# Patient Record
Sex: FEMALE | Race: WHITE | NOT HISPANIC OR LATINO | Employment: FULL TIME | URBAN - METROPOLITAN AREA
[De-identification: names, ages, dates, MRNs, and addresses within clinical notes are randomized per-mention and may not be internally consistent; named-entity substitution may affect disease eponyms.]

---

## 2023-05-17 ENCOUNTER — TELEPHONE (OUTPATIENT)
Dept: PRIMARY CARE | Facility: CLINIC | Age: 68
End: 2023-05-17
Payer: MEDICARE

## 2023-05-18 ENCOUNTER — TELEMEDICINE (OUTPATIENT)
Dept: PRIMARY CARE | Facility: CLINIC | Age: 68
End: 2023-05-18
Payer: MEDICARE

## 2023-05-18 VITALS — TEMPERATURE: 98.1 F

## 2023-05-18 DIAGNOSIS — J98.01 COUGH DUE TO BRONCHOSPASM: ICD-10-CM

## 2023-05-18 DIAGNOSIS — J06.9 ACUTE UPPER RESPIRATORY INFECTION: Primary | ICD-10-CM

## 2023-05-18 PROCEDURE — 99213 OFFICE O/P EST LOW 20 MIN: CPT | Performed by: INTERNAL MEDICINE

## 2023-05-18 RX ORDER — METOPROLOL SUCCINATE 50 MG/1
1 TABLET, EXTENDED RELEASE ORAL DAILY
COMMUNITY
Start: 2022-03-10 | End: 2024-03-19 | Stop reason: SDUPTHER

## 2023-05-18 RX ORDER — VENLAFAXINE HYDROCHLORIDE 37.5 MG/1
37.5 CAPSULE, EXTENDED RELEASE ORAL DAILY
COMMUNITY
End: 2023-05-31 | Stop reason: SDUPTHER

## 2023-05-18 RX ORDER — ROSUVASTATIN CALCIUM 10 MG/1
10 TABLET, COATED ORAL NIGHTLY
COMMUNITY
End: 2024-04-12 | Stop reason: SDUPTHER

## 2023-05-18 RX ORDER — FENOFIBRATE 48 MG/1
48 TABLET, FILM COATED ORAL DAILY
COMMUNITY
End: 2023-06-05 | Stop reason: DRUGHIGH

## 2023-05-18 RX ORDER — ALBUTEROL SULFATE 90 UG/1
2 AEROSOL, METERED RESPIRATORY (INHALATION) EVERY 6 HOURS PRN
Qty: 18 G | Refills: 0 | Status: SHIPPED | OUTPATIENT
Start: 2023-05-18 | End: 2023-10-03

## 2023-05-18 RX ORDER — LEVOTHYROXINE SODIUM 50 UG/1
1 TABLET ORAL DAILY
COMMUNITY
Start: 2019-07-09 | End: 2023-05-23

## 2023-05-18 NOTE — PROGRESS NOTES
Subjective   Patient ID: Laure Barton is a 67 y.o. female who presents for Bronchitis (Video visit for sinus or bronchitis issues).  Reports 7 day history of gradually improving, sinus congestion, intermittent, non productive cough, fatigue, body aches. Resolved low grade fever, that was present at the onset of the symptoms. Denies SOB, chills.      Assessment/Plan   Diagnoses and all orders for this visit:  Acute upper respiratory infection  Cough due to bronchospasm  -     albuterol (ProAir HFA) 90 mcg/actuation inhaler; Inhale 2 puffs every 6 hours if needed for wheezing or shortness of breath.

## 2023-05-22 DIAGNOSIS — E03.9 HYPOTHYROIDISM, UNSPECIFIED TYPE: Primary | ICD-10-CM

## 2023-05-23 RX ORDER — LEVOTHYROXINE SODIUM 50 UG/1
50 TABLET ORAL DAILY
Qty: 90 TABLET | Refills: 0 | Status: SHIPPED | OUTPATIENT
Start: 2023-05-23 | End: 2023-05-31 | Stop reason: SDUPTHER

## 2023-05-31 ENCOUNTER — OFFICE VISIT (OUTPATIENT)
Dept: PRIMARY CARE | Facility: CLINIC | Age: 68
End: 2023-05-31
Payer: MEDICARE

## 2023-05-31 ENCOUNTER — LAB (OUTPATIENT)
Dept: LAB | Facility: LAB | Age: 68
End: 2023-05-31
Payer: MEDICARE

## 2023-05-31 VITALS
DIASTOLIC BLOOD PRESSURE: 74 MMHG | HEIGHT: 63 IN | SYSTOLIC BLOOD PRESSURE: 124 MMHG | BODY MASS INDEX: 30.65 KG/M2 | WEIGHT: 173 LBS

## 2023-05-31 DIAGNOSIS — R89.9 ABNORMAL LABORATORY TEST RESULT: ICD-10-CM

## 2023-05-31 DIAGNOSIS — E03.9 HYPOTHYROIDISM, UNSPECIFIED TYPE: ICD-10-CM

## 2023-05-31 DIAGNOSIS — Z00.00 MEDICARE ANNUAL WELLNESS VISIT, SUBSEQUENT: ICD-10-CM

## 2023-05-31 DIAGNOSIS — R73.01 IFG (IMPAIRED FASTING GLUCOSE): ICD-10-CM

## 2023-05-31 DIAGNOSIS — R80.9 ALBUMINURIA: ICD-10-CM

## 2023-05-31 DIAGNOSIS — E78.1 HYPERTRIGLYCERIDEMIA: ICD-10-CM

## 2023-05-31 DIAGNOSIS — I42.2 CARDIOMYOPATHY, HYPERTROPHIC (MULTI): ICD-10-CM

## 2023-05-31 DIAGNOSIS — E78.5 HYPERLIPIDEMIA, UNSPECIFIED HYPERLIPIDEMIA TYPE: ICD-10-CM

## 2023-05-31 DIAGNOSIS — C50.919 MALIGNANT NEOPLASM OF FEMALE BREAST, UNSPECIFIED ESTROGEN RECEPTOR STATUS, UNSPECIFIED LATERALITY, UNSPECIFIED SITE OF BREAST (MULTI): ICD-10-CM

## 2023-05-31 DIAGNOSIS — D68.51 HETEROZYGOUS FACTOR V LEIDEN MUTATION (MULTI): ICD-10-CM

## 2023-05-31 DIAGNOSIS — Z23 IMMUNIZATION DUE: ICD-10-CM

## 2023-05-31 DIAGNOSIS — F41.8 DEPRESSION WITH ANXIETY: ICD-10-CM

## 2023-05-31 DIAGNOSIS — Z00.00 MEDICARE ANNUAL WELLNESS VISIT, SUBSEQUENT: Primary | ICD-10-CM

## 2023-05-31 LAB
ALANINE AMINOTRANSFERASE (SGPT) (U/L) IN SER/PLAS: 11 U/L (ref 7–45)
ALBUMIN (G/DL) IN SER/PLAS: 4.4 G/DL (ref 3.4–5)
ALBUMIN (MG/L) IN URINE: <7 MG/L
ALBUMIN/CREATININE (UG/MG) IN URINE: NORMAL UG/MG CRT (ref 0–30)
ALKALINE PHOSPHATASE (U/L) IN SER/PLAS: 65 U/L (ref 33–136)
ANION GAP IN SER/PLAS: 13 MMOL/L (ref 10–20)
ASPARTATE AMINOTRANSFERASE (SGOT) (U/L) IN SER/PLAS: 17 U/L (ref 9–39)
BILIRUBIN TOTAL (MG/DL) IN SER/PLAS: 0.5 MG/DL (ref 0–1.2)
CALCIUM (MG/DL) IN SER/PLAS: 9.9 MG/DL (ref 8.6–10.6)
CARBON DIOXIDE, TOTAL (MMOL/L) IN SER/PLAS: 30 MMOL/L (ref 21–32)
CHLORIDE (MMOL/L) IN SER/PLAS: 100 MMOL/L (ref 98–107)
CHOLESTEROL (MG/DL) IN SER/PLAS: 190 MG/DL (ref 0–199)
CHOLESTEROL IN HDL (MG/DL) IN SER/PLAS: 44.3 MG/DL
CHOLESTEROL/HDL RATIO: 4.3
CREATININE (MG/DL) IN SER/PLAS: 0.71 MG/DL (ref 0.5–1.05)
CREATININE (MG/DL) IN URINE: 100 MG/DL (ref 20–320)
ERYTHROCYTE DISTRIBUTION WIDTH (RATIO) BY AUTOMATED COUNT: 12.9 % (ref 11.5–14.5)
ERYTHROCYTE MEAN CORPUSCULAR HEMOGLOBIN CONCENTRATION (G/DL) BY AUTOMATED: 32.5 G/DL (ref 32–36)
ERYTHROCYTE MEAN CORPUSCULAR VOLUME (FL) BY AUTOMATED COUNT: 92 FL (ref 80–100)
ERYTHROCYTES (10*6/UL) IN BLOOD BY AUTOMATED COUNT: 4.06 X10E12/L (ref 4–5.2)
ESTIMATED AVERAGE GLUCOSE FOR HBA1C: 117 MG/DL
GFR FEMALE: >90 ML/MIN/1.73M2
GLUCOSE (MG/DL) IN SER/PLAS: 92 MG/DL (ref 74–99)
HEMATOCRIT (%) IN BLOOD BY AUTOMATED COUNT: 37.5 % (ref 36–46)
HEMOGLOBIN (G/DL) IN BLOOD: 12.2 G/DL (ref 12–16)
HEMOGLOBIN A1C/HEMOGLOBIN TOTAL IN BLOOD: 5.7 %
LDL: ABNORMAL MG/DL (ref 0–99)
LEUKOCYTES (10*3/UL) IN BLOOD BY AUTOMATED COUNT: 6.3 X10E9/L (ref 4.4–11.3)
NON HDL CHOLESTEROL: 146 MG/DL
NRBC (PER 100 WBCS) BY AUTOMATED COUNT: 0 /100 WBC (ref 0–0)
PLATELETS (10*3/UL) IN BLOOD AUTOMATED COUNT: 235 X10E9/L (ref 150–450)
POTASSIUM (MMOL/L) IN SER/PLAS: 5.6 MMOL/L (ref 3.5–5.3)
PROTEIN TOTAL: 7.1 G/DL (ref 6.4–8.2)
SODIUM (MMOL/L) IN SER/PLAS: 137 MMOL/L (ref 136–145)
THYROTROPIN (MIU/L) IN SER/PLAS BY DETECTION LIMIT <= 0.05 MIU/L: 2.11 MIU/L (ref 0.44–3.98)
TRIGLYCERIDE (MG/DL) IN SER/PLAS: 449 MG/DL (ref 0–149)
UREA NITROGEN (MG/DL) IN SER/PLAS: 20 MG/DL (ref 6–23)
VLDL: ABNORMAL MG/DL (ref 0–40)

## 2023-05-31 PROCEDURE — 1160F RVW MEDS BY RX/DR IN RCRD: CPT | Performed by: INTERNAL MEDICINE

## 2023-05-31 PROCEDURE — 82043 UR ALBUMIN QUANTITATIVE: CPT

## 2023-05-31 PROCEDURE — 82570 ASSAY OF URINE CREATININE: CPT

## 2023-05-31 PROCEDURE — 80053 COMPREHEN METABOLIC PANEL: CPT

## 2023-05-31 PROCEDURE — 1159F MED LIST DOCD IN RCRD: CPT | Performed by: INTERNAL MEDICINE

## 2023-05-31 PROCEDURE — G0402 INITIAL PREVENTIVE EXAM: HCPCS | Performed by: INTERNAL MEDICINE

## 2023-05-31 PROCEDURE — 36415 COLL VENOUS BLD VENIPUNCTURE: CPT

## 2023-05-31 PROCEDURE — G0009 ADMIN PNEUMOCOCCAL VACCINE: HCPCS | Performed by: INTERNAL MEDICINE

## 2023-05-31 PROCEDURE — 80061 LIPID PANEL: CPT

## 2023-05-31 PROCEDURE — 84443 ASSAY THYROID STIM HORMONE: CPT

## 2023-05-31 PROCEDURE — 1170F FXNL STATUS ASSESSED: CPT | Performed by: INTERNAL MEDICINE

## 2023-05-31 PROCEDURE — 83036 HEMOGLOBIN GLYCOSYLATED A1C: CPT

## 2023-05-31 PROCEDURE — 90677 PCV20 VACCINE IM: CPT | Performed by: INTERNAL MEDICINE

## 2023-05-31 PROCEDURE — 1036F TOBACCO NON-USER: CPT | Performed by: INTERNAL MEDICINE

## 2023-05-31 PROCEDURE — 85027 COMPLETE CBC AUTOMATED: CPT

## 2023-05-31 RX ORDER — VENLAFAXINE 25 MG/1
1 TABLET ORAL DAILY
COMMUNITY
Start: 2018-11-20 | End: 2023-05-31

## 2023-05-31 RX ORDER — LEVOTHYROXINE SODIUM 50 UG/1
50 TABLET ORAL DAILY
Qty: 90 TABLET | Refills: 3 | Status: SHIPPED | OUTPATIENT
Start: 2023-05-31 | End: 2023-06-03 | Stop reason: SDUPTHER

## 2023-05-31 RX ORDER — VENLAFAXINE HYDROCHLORIDE 37.5 MG/1
37.5 CAPSULE, EXTENDED RELEASE ORAL DAILY
Qty: 90 CAPSULE | Refills: 3 | Status: SHIPPED | OUTPATIENT
Start: 2023-05-31 | End: 2024-04-12 | Stop reason: SDUPTHER

## 2023-05-31 RX ORDER — ANASTROZOLE 1 MG/1
1 TABLET ORAL NIGHTLY
COMMUNITY
End: 2024-04-17

## 2023-05-31 ASSESSMENT — ACTIVITIES OF DAILY LIVING (ADL)
DRESSING: INDEPENDENT
MANAGING_FINANCES: INDEPENDENT
DOING_HOUSEWORK: INDEPENDENT
TAKING_MEDICATION: INDEPENDENT
BATHING: INDEPENDENT
GROCERY_SHOPPING: INDEPENDENT

## 2023-05-31 ASSESSMENT — PATIENT HEALTH QUESTIONNAIRE - PHQ9
2. FEELING DOWN, DEPRESSED OR HOPELESS: NOT AT ALL
SUM OF ALL RESPONSES TO PHQ9 QUESTIONS 1 AND 2: 0
1. LITTLE INTEREST OR PLEASURE IN DOING THINGS: NOT AT ALL

## 2023-05-31 NOTE — PROGRESS NOTES
"Subjective   Patient ID: Laure Barton is a 67 y.o. female who presents for Medicare Annual Wellness Visit Subsequent (Patient here for medicare wellness visit).    HPI   Gradually recovering from an URI, C/O intermittent, non productive cough, no fever, chills, SOB,   Review of Systems  cough  Objective   Ht 1.6 m (5' 3\")   Wt 78.5 kg (173 lb)   BMI 30.65 kg/m²     Physical Exam  NAD. Cooperative.  HEENT: WNL  Neck: WNL  Lungs CTA  Heart: RRR  Abdomen: WNL  Musculoskeletal system: WNL  Neurologic exam: WNL    Assessment/Plan   Diagnoses and all orders for this visit:  Medicare annual wellness visit, subsequent  -     Comprehensive metabolic panel; Future  Hypothyroidism, unspecified type  -     levothyroxine (Synthroid, Levoxyl) 50 mcg tablet; Take 1 tablet (50 mcg) by mouth once daily.  -     CBC; Future  -     TSH; Future  Immunization due  -     Pneumococcal conjugate vaccine, 20-valent, adult (PREVNAR 20)  Hyperlipidemia, unspecified hyperlipidemia type  -     Lipid panel; Future  Hypertriglyceridemia  Albuminuria  -     Albumin, urine, random; Future  IFG (impaired fasting glucose)  -     Hemoglobin A1C; Future  Depression with anxiety  -     venlafaxine XR (Effexor-XR) 37.5 mg 24 hr capsule; Take 1 capsule (37.5 mg) by mouth once daily.  Postviral drip and bronchospasm cough, improving.  Hydration, PRN follow up.       "

## 2023-06-03 PROBLEM — Z91.89 AT RISK FOR DIABETES MELLITUS: Status: ACTIVE | Noted: 2023-06-03

## 2023-06-03 RX ORDER — FENOFIBRATE 160 MG/1
160 TABLET ORAL DAILY
Qty: 90 TABLET | Refills: 1 | Status: SHIPPED | OUTPATIENT
Start: 2023-06-03 | End: 2023-06-05 | Stop reason: SDUPTHER

## 2023-06-03 RX ORDER — LEVOTHYROXINE SODIUM 50 UG/1
50 TABLET ORAL DAILY
Qty: 90 TABLET | Refills: 3 | Status: SHIPPED | OUTPATIENT
Start: 2023-06-03 | End: 2024-04-12 | Stop reason: SDUPTHER

## 2023-06-05 DIAGNOSIS — E78.1 HYPERTRIGLYCERIDEMIA: ICD-10-CM

## 2023-06-05 RX ORDER — FENOFIBRATE 160 MG/1
160 TABLET ORAL DAILY
Qty: 90 TABLET | Refills: 1 | Status: SHIPPED | OUTPATIENT
Start: 2023-06-05 | End: 2023-12-07

## 2023-06-06 ENCOUNTER — HOSPITAL ENCOUNTER (OUTPATIENT)
Dept: DATA CONVERSION | Facility: HOSPITAL | Age: 68
End: 2023-06-06
Attending: INTERNAL MEDICINE
Payer: MEDICARE

## 2023-06-06 DIAGNOSIS — K64.8 OTHER HEMORRHOIDS: ICD-10-CM

## 2023-06-06 DIAGNOSIS — I10 ESSENTIAL (PRIMARY) HYPERTENSION: ICD-10-CM

## 2023-06-06 DIAGNOSIS — F41.9 ANXIETY DISORDER, UNSPECIFIED: ICD-10-CM

## 2023-06-06 DIAGNOSIS — K57.30 DIVERTICULOSIS OF LARGE INTESTINE WITHOUT PERFORATION OR ABSCESS WITHOUT BLEEDING: ICD-10-CM

## 2023-06-06 DIAGNOSIS — J45.909 UNSPECIFIED ASTHMA, UNCOMPLICATED (HHS-HCC): ICD-10-CM

## 2023-06-06 DIAGNOSIS — D64.9 ANEMIA, UNSPECIFIED: ICD-10-CM

## 2023-06-06 DIAGNOSIS — D12.3 BENIGN NEOPLASM OF TRANSVERSE COLON: ICD-10-CM

## 2023-06-06 DIAGNOSIS — Z12.11 ENCOUNTER FOR SCREENING FOR MALIGNANT NEOPLASM OF COLON: ICD-10-CM

## 2023-06-06 DIAGNOSIS — Z85.3 PERSONAL HISTORY OF MALIGNANT NEOPLASM OF BREAST: ICD-10-CM

## 2023-06-06 DIAGNOSIS — G47.33 OBSTRUCTIVE SLEEP APNEA (ADULT) (PEDIATRIC): ICD-10-CM

## 2023-06-06 DIAGNOSIS — D68.51 ACTIVATED PROTEIN C RESISTANCE (MULTI): ICD-10-CM

## 2023-06-06 DIAGNOSIS — E78.5 HYPERLIPIDEMIA, UNSPECIFIED: ICD-10-CM

## 2023-06-06 DIAGNOSIS — D69.6 THROMBOCYTOPENIA, UNSPECIFIED (CMS-HCC): ICD-10-CM

## 2023-06-06 DIAGNOSIS — F32.A DEPRESSION, UNSPECIFIED: ICD-10-CM

## 2023-06-06 DIAGNOSIS — Z88.0 ALLERGY STATUS TO PENICILLIN: ICD-10-CM

## 2023-06-06 DIAGNOSIS — E03.9 HYPOTHYROIDISM, UNSPECIFIED: ICD-10-CM

## 2023-06-06 DIAGNOSIS — K21.9 GASTRO-ESOPHAGEAL REFLUX DISEASE WITHOUT ESOPHAGITIS: ICD-10-CM

## 2023-06-13 ENCOUNTER — LAB (OUTPATIENT)
Dept: LAB | Facility: LAB | Age: 68
End: 2023-06-13
Payer: MEDICARE

## 2023-06-13 DIAGNOSIS — R89.9 ABNORMAL LABORATORY TEST RESULT: ICD-10-CM

## 2023-06-13 LAB — POTASSIUM (MMOL/L) IN SER/PLAS: 5.1 MMOL/L (ref 3.5–5.3)

## 2023-06-13 PROCEDURE — 84132 ASSAY OF SERUM POTASSIUM: CPT

## 2023-06-13 PROCEDURE — 36415 COLL VENOUS BLD VENIPUNCTURE: CPT

## 2023-06-14 LAB
COMPLETE PATHOLOGY REPORT: NORMAL
CONVERTED CLINICAL DIAGNOSIS-HISTORY: NORMAL
CONVERTED FINAL DIAGNOSIS: NORMAL
CONVERTED FINAL REPORT PDF LINK TO COPY AND PASTE: NORMAL
CONVERTED GROSS DESCRIPTION: NORMAL

## 2023-06-23 ENCOUNTER — OFFICE VISIT (OUTPATIENT)
Dept: PRIMARY CARE | Facility: CLINIC | Age: 68
End: 2023-06-23
Payer: MEDICARE

## 2023-06-23 VITALS
BODY MASS INDEX: 30.47 KG/M2 | WEIGHT: 172 LBS | SYSTOLIC BLOOD PRESSURE: 124 MMHG | HEART RATE: 78 BPM | OXYGEN SATURATION: 98 % | DIASTOLIC BLOOD PRESSURE: 76 MMHG

## 2023-06-23 DIAGNOSIS — S40.869A TICK BITE OF UPPER ARM, UNSPECIFIED LATERALITY, INITIAL ENCOUNTER: Primary | ICD-10-CM
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
## 2023-06-23 DIAGNOSIS — L30.9 DERMATITIS: ICD-10-CM

## 2023-06-23 PROCEDURE — 1036F TOBACCO NON-USER: CPT | Performed by: INTERNAL MEDICINE

## 2023-06-23 PROCEDURE — 99214 OFFICE O/P EST MOD 30 MIN: CPT | Performed by: INTERNAL MEDICINE

## 2023-06-23 PROCEDURE — 1160F RVW MEDS BY RX/DR IN RCRD: CPT | Performed by: INTERNAL MEDICINE

## 2023-06-23 PROCEDURE — 1159F MED LIST DOCD IN RCRD: CPT | Performed by: INTERNAL MEDICINE

## 2023-06-23 RX ORDER — DOXYCYCLINE 100 MG/1
200 CAPSULE ORAL ONCE
Qty: 2 CAPSULE | Refills: 1 | Status: SHIPPED | OUTPATIENT
Start: 2023-06-23 | End: 2023-06-23

## 2023-06-23 NOTE — PROGRESS NOTES
Subjective   Patient ID: Laure Barton is a 67 y.o. female who presents for Tick Removal (Patient had tick on her over the weekend and scar tissue irritated on her stomach ).    HPI   The patient reports tick bite, removed 72 hours prior the office visit, no skin rash or other symptoms.  C/O skin redness on the lower abdomen,  scar area, 5 days duration, unchanged since the onset, with a minimal  tenderness with applying pressure.    Review of Systems  Tick bite  Skin redness  Objective   Pulse 78   Wt 78 kg (172 lb)   SpO2 98%   BMI 30.47 kg/m²     Physical Exam  NAD. Cooperative.  Skin: erythematous patch, well demarcated, suprapubic area, over the distal area of the well healed surgical scar, 1 cm in size,   Lungs CTA  Heart: RRR  Abdomen: WNL  Musculoskeletal system: WNL  Neurologic exam: WNL    Assessment/Plan   Diagnoses and all orders for this visit:  Tick bite of upper arm, unspecified laterality, initial encounter  -     doxycycline (Vibramycin) 100 mg capsule; Take 2 capsules (200 mg) by mouth 1 time for 1 dose. Take with at least 8 ounces (large glass) of water, do not lie down for 30 minutes after  Dermatitis  Watchful waiting, a trial of topical Ketoconazole 2% cream from home supply if not resolving, followed by topical steroid cream.

## 2023-10-01 PROBLEM — M25.569 JOINT PAIN, KNEE: Status: ACTIVE | Noted: 2023-10-01

## 2023-10-01 PROBLEM — D22.5 MELANOCYTIC NEVI OF TRUNK: Status: ACTIVE | Noted: 2022-09-26

## 2023-10-01 PROBLEM — M21.6X9 PRONATION DEFORMITY OF FOOT: Status: ACTIVE | Noted: 2023-10-01

## 2023-10-01 PROBLEM — Z90.11 STATUS POST PARTIAL MASTECTOMY OF RIGHT BREAST: Status: ACTIVE | Noted: 2023-10-01

## 2023-10-01 PROBLEM — M62.838 MUSCLE SPASM: Status: ACTIVE | Noted: 2023-10-01

## 2023-10-01 PROBLEM — R09.89 RHONCHI AT LEFT LUNG BASE: Status: ACTIVE | Noted: 2023-10-01

## 2023-10-01 PROBLEM — I49.3 PVC (PREMATURE VENTRICULAR CONTRACTION): Status: ACTIVE | Noted: 2023-10-01

## 2023-10-01 PROBLEM — M54.16 LUMBAR RADICULOPATHY: Status: ACTIVE | Noted: 2023-10-01

## 2023-10-01 PROBLEM — I34.0 NON-RHEUMATIC MITRAL REGURGITATION: Status: ACTIVE | Noted: 2023-10-01

## 2023-10-01 PROBLEM — L82.1 OTHER SEBORRHEIC KERATOSIS: Status: ACTIVE | Noted: 2022-09-26

## 2023-10-01 PROBLEM — J30.89 ALLERGIC RHINITIS DUE TO DUST: Status: ACTIVE | Noted: 2023-10-01

## 2023-10-01 PROBLEM — R51.9 HEADACHE: Status: ACTIVE | Noted: 2023-10-01

## 2023-10-01 PROBLEM — I10 HYPERTENSION: Status: ACTIVE | Noted: 2023-10-01

## 2023-10-01 PROBLEM — G47.33 OBSTRUCTIVE SLEEP APNEA SYNDROME: Status: ACTIVE | Noted: 2017-03-23

## 2023-10-01 PROBLEM — S20.219A CONTUSION OF RIB: Status: ACTIVE | Noted: 2023-10-01

## 2023-10-01 PROBLEM — Z20.822 EXPOSURE TO SEVERE ACUTE RESPIRATORY SYNDROME CORONAVIRUS 2 (SARS-COV-2): Status: ACTIVE | Noted: 2023-10-01

## 2023-10-01 PROBLEM — E87.1 HYPONATREMIA: Status: ACTIVE | Noted: 2023-10-01

## 2023-10-01 PROBLEM — R92.1 BREAST CALCIFICATION, RIGHT: Status: ACTIVE | Noted: 2023-10-01

## 2023-10-01 PROBLEM — R30.0 DYSURIA: Status: ACTIVE | Noted: 2023-10-01

## 2023-10-01 PROBLEM — S39.012A LOW BACK STRAIN: Status: ACTIVE | Noted: 2023-10-01

## 2023-10-01 PROBLEM — E55.9 VITAMIN D DEFICIENCY: Status: ACTIVE | Noted: 2023-10-01

## 2023-10-01 PROBLEM — R00.2 PALPITATIONS: Status: ACTIVE | Noted: 2023-10-01

## 2023-10-01 PROBLEM — L23.7 ALLERGIC CONTACT DERMATITIS DUE TO PLANTS, EXCEPT FOOD: Status: ACTIVE | Noted: 2022-09-26

## 2023-10-01 PROBLEM — H10.45 CHRONIC ALLERGIC CONJUNCTIVITIS: Status: ACTIVE | Noted: 2023-10-01

## 2023-10-01 PROBLEM — R32 URINARY INCONTINENCE: Status: ACTIVE | Noted: 2023-10-01
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
## 2023-10-01 PROBLEM — R50.9 FEVER AND CHILLS: Status: ACTIVE | Noted: 2023-10-01

## 2023-10-01 PROBLEM — B37.89 CANDIDIASIS OF BREAST: Status: ACTIVE | Noted: 2023-10-01

## 2023-10-01 PROBLEM — M47.816 OSTEOARTHRITIS OF LUMBAR SPINE: Status: ACTIVE | Noted: 2023-10-01

## 2023-10-01 PROBLEM — J18.9 PNEUMONIA: Status: ACTIVE | Noted: 2023-10-01

## 2023-10-01 PROBLEM — D64.9 ANEMIA: Status: ACTIVE | Noted: 2023-10-01

## 2023-10-01 PROBLEM — L81.4 OTHER MELANIN HYPERPIGMENTATION: Status: ACTIVE | Noted: 2022-09-26

## 2023-10-01 PROBLEM — D05.11 DUCTAL CARCINOMA IN SITU (DCIS) OF RIGHT BREAST: Status: ACTIVE | Noted: 2023-10-01

## 2023-10-01 PROBLEM — J98.01 BRONCHOSPASM: Status: ACTIVE | Noted: 2023-10-01

## 2023-10-01 PROBLEM — D18.01 HEMANGIOMA OF SKIN AND SUBCUTANEOUS TISSUE: Status: ACTIVE | Noted: 2022-09-26

## 2023-10-01 PROBLEM — R94.8: Status: ACTIVE | Noted: 2023-10-01

## 2023-10-01 PROBLEM — J34.89 STUFFY AND RUNNY NOSE: Status: ACTIVE | Noted: 2023-10-01

## 2023-10-01 PROBLEM — N63.0 BREAST NODULE: Status: ACTIVE | Noted: 2023-10-01

## 2023-10-01 PROBLEM — F41.9 ANXIETY: Status: ACTIVE | Noted: 2023-10-01

## 2023-10-01 PROBLEM — L91.0 KELOID: Status: ACTIVE | Noted: 2022-09-26

## 2023-10-01 PROBLEM — M70.50 PES ANSERINE BURSITIS: Status: ACTIVE | Noted: 2023-10-01

## 2023-10-01 PROBLEM — M54.9 BACK PAIN: Status: ACTIVE | Noted: 2023-10-01

## 2023-10-01 PROBLEM — R07.89 ATYPICAL CHEST PAIN: Status: ACTIVE | Noted: 2023-10-01

## 2023-10-01 PROBLEM — M25.561 RIGHT KNEE PAIN: Status: ACTIVE | Noted: 2023-10-01

## 2023-10-01 PROBLEM — M25.512 LEFT SHOULDER PAIN: Status: ACTIVE | Noted: 2023-10-01

## 2023-10-01 PROBLEM — S76.219A STRAIN OF ADDUCTOR MUSCLE OF THIGH: Status: ACTIVE | Noted: 2018-01-09

## 2023-10-01 PROBLEM — R92.8 ABNORMAL MAMMOGRAM OF RIGHT BREAST: Status: ACTIVE | Noted: 2023-10-01

## 2023-10-01 PROBLEM — L91.0 HYPERTROPHIC SCAR: Status: ACTIVE | Noted: 2022-09-26

## 2023-10-01 PROBLEM — D68.59 THROMBOPHILIA (MULTI): Status: ACTIVE | Noted: 2023-10-01

## 2023-10-01 PROBLEM — L91.8 OTHER HYPERTROPHIC DISORDERS OF THE SKIN: Status: ACTIVE | Noted: 2022-09-26

## 2023-10-01 PROBLEM — R29.898 WEAKNESS OF RIGHT LOWER EXTREMITY: Status: ACTIVE | Noted: 2023-10-01

## 2023-10-01 PROBLEM — S23.8XXA SPRAIN OF CHEST WALL: Status: ACTIVE | Noted: 2023-10-01

## 2023-10-01 PROBLEM — M94.0 COSTOCHONDRITIS: Status: ACTIVE | Noted: 2023-10-01

## 2023-10-01 PROBLEM — R07.81 RIB PAIN: Status: ACTIVE | Noted: 2023-10-01

## 2023-10-01 PROBLEM — T78.40XA ALLERGIES: Status: ACTIVE | Noted: 2023-10-01

## 2023-10-01 PROBLEM — R05.3 CHRONIC COUGH: Status: ACTIVE | Noted: 2023-10-01

## 2023-10-01 RX ORDER — OMEGA-3-ACID ETHYL ESTERS 1 G/1
4 CAPSULE, LIQUID FILLED ORAL
COMMUNITY
End: 2023-10-03 | Stop reason: ALTCHOICE

## 2023-10-01 RX ORDER — CHOLECALCIFEROL (VITAMIN D3) 50 MCG
TABLET ORAL
COMMUNITY
Start: 2019-10-10

## 2023-10-01 RX ORDER — TIZANIDINE 2 MG/1
TABLET ORAL
COMMUNITY
Start: 2022-10-28 | End: 2023-10-03 | Stop reason: ALTCHOICE

## 2023-10-01 RX ORDER — MULTIVITAMIN
1 TABLET ORAL DAILY
COMMUNITY
Start: 2022-02-28 | End: 2023-10-03 | Stop reason: ALTCHOICE

## 2023-10-01 RX ORDER — CYCLOBENZAPRINE HCL 10 MG
1 TABLET ORAL NIGHTLY
COMMUNITY
Start: 2022-08-18 | End: 2023-10-03 | Stop reason: ALTCHOICE

## 2023-10-01 RX ORDER — PREDNISONE 10 MG/1
TABLET ORAL DAILY
COMMUNITY
Start: 2022-10-28 | End: 2023-10-03 | Stop reason: ALTCHOICE

## 2023-10-01 RX ORDER — POLYETHYLENE GLYCOL 3350, SODIUM CHLORIDE, SODIUM BICARBONATE, POTASSIUM CHLORIDE 420; 11.2; 5.72; 1.48 G/4L; G/4L; G/4L; G/4L
POWDER, FOR SOLUTION ORAL
COMMUNITY
Start: 2023-04-03 | End: 2023-10-03 | Stop reason: ALTCHOICE

## 2023-10-01 RX ORDER — KETOCONAZOLE 20 MG/G
CREAM TOPICAL
COMMUNITY
Start: 2022-05-05 | End: 2023-10-03 | Stop reason: ALTCHOICE

## 2023-10-01 RX ORDER — OMEPRAZOLE 20 MG/1
20 CAPSULE, DELAYED RELEASE ORAL WEEKLY
COMMUNITY
End: 2023-10-03 | Stop reason: ALTCHOICE

## 2023-10-01 RX ORDER — ASPIRIN 81 MG/1
TABLET ORAL
COMMUNITY
Start: 2010-11-23 | End: 2023-10-03 | Stop reason: ALTCHOICE

## 2023-10-01 RX ORDER — FLUTICASONE PROPIONATE 50 MCG
2 SPRAY, SUSPENSION (ML) NASAL DAILY
COMMUNITY
Start: 2021-12-13 | End: 2023-10-03 | Stop reason: ALTCHOICE

## 2023-10-03 ENCOUNTER — OFFICE VISIT (OUTPATIENT)
Dept: CARDIOLOGY | Facility: CLINIC | Age: 68
End: 2023-10-03
Payer: MEDICARE

## 2023-10-03 VITALS
WEIGHT: 177 LBS | TEMPERATURE: 97.8 F | SYSTOLIC BLOOD PRESSURE: 128 MMHG | HEART RATE: 76 BPM | DIASTOLIC BLOOD PRESSURE: 77 MMHG | BODY MASS INDEX: 31.35 KG/M2

## 2023-10-03 DIAGNOSIS — E03.9 HYPOTHYROIDISM, UNSPECIFIED TYPE: ICD-10-CM

## 2023-10-03 DIAGNOSIS — Z98.890 S/P VENTRICULAR SEPTAL MYECTOMY: ICD-10-CM

## 2023-10-03 DIAGNOSIS — I10 ESSENTIAL HYPERTENSION: ICD-10-CM

## 2023-10-03 DIAGNOSIS — I42.2 CARDIOMYOPATHY, HYPERTROPHIC (MULTI): Primary | ICD-10-CM

## 2023-10-03 DIAGNOSIS — D68.51 HETEROZYGOUS FACTOR V LEIDEN MUTATION (MULTI): ICD-10-CM

## 2023-10-03 PROCEDURE — 1160F RVW MEDS BY RX/DR IN RCRD: CPT | Performed by: INTERNAL MEDICINE

## 2023-10-03 PROCEDURE — 1125F AMNT PAIN NOTED PAIN PRSNT: CPT | Performed by: INTERNAL MEDICINE

## 2023-10-03 PROCEDURE — 99214 OFFICE O/P EST MOD 30 MIN: CPT | Performed by: INTERNAL MEDICINE

## 2023-10-03 PROCEDURE — 3074F SYST BP LT 130 MM HG: CPT | Performed by: INTERNAL MEDICINE

## 2023-10-03 PROCEDURE — 1159F MED LIST DOCD IN RCRD: CPT | Performed by: INTERNAL MEDICINE

## 2023-10-03 PROCEDURE — 3078F DIAST BP <80 MM HG: CPT | Performed by: INTERNAL MEDICINE

## 2023-10-03 PROCEDURE — 1036F TOBACCO NON-USER: CPT | Performed by: INTERNAL MEDICINE

## 2023-10-03 NOTE — PROGRESS NOTES
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 Lives with her  and her adult son. Here for routine visit. She is doing well and is essentially asymptomatic.    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 Valladares, February 2022     Assessment: HCM and prior myectomy, doing great, routine visit    -- Continue metoprolol succinate  -- I encouraged exercise  -- Will having our HCM coordinator call her and offer her son genetic testing    Jorge Fernandez MD, MPH  Advanced Heart Failure and Transplant Cardiology  [facility]  [facility]

## 2023-10-03 NOTE — PATIENT INSTRUCTIONS
To reach Dr. Fernandez's office please call [phone] ([facility]). Fax [phone]. Call [phone] to schedule an appointment. You may also contact the HF RNs at [email or web address]    Thank you for coming to your appointment today. If you have any questions or need cardiac medication refills, please call the Heart Failure Office at [phone] option [phone].   You may also contact the HF RNs at [email or web address]     Follow up with Dr. Fernandez in 2-3 years

## 2023-10-25 ENCOUNTER — TELEPHONE (OUTPATIENT)
Dept: DERMATOLOGY | Facility: CLINIC | Age: 68
End: 2023-10-25
Payer: MEDICARE

## 2023-10-27 ENCOUNTER — OFFICE VISIT (OUTPATIENT)
Dept: DERMATOLOGY | Facility: CLINIC | Age: 68
End: 2023-10-27
Payer: MEDICARE

## 2023-10-27 DIAGNOSIS — L57.0 ACTINIC KERATOSIS: Primary | ICD-10-CM

## 2023-10-27 PROCEDURE — 1036F TOBACCO NON-USER: CPT | Performed by: NURSE PRACTITIONER

## 2023-10-27 PROCEDURE — 1159F MED LIST DOCD IN RCRD: CPT | Performed by: NURSE PRACTITIONER

## 2023-10-27 PROCEDURE — 1160F RVW MEDS BY RX/DR IN RCRD: CPT | Performed by: NURSE PRACTITIONER

## 2023-10-27 PROCEDURE — 99213 OFFICE O/P EST LOW 20 MIN: CPT | Performed by: NURSE PRACTITIONER

## 2023-10-27 PROCEDURE — 1125F AMNT PAIN NOTED PAIN PRSNT: CPT | Performed by: NURSE PRACTITIONER

## 2023-10-27 ASSESSMENT — DERMATOLOGY QUALITY OF LIFE (QOL) ASSESSMENT
DATE THE QUALITY-OF-LIFE ASSESSMENT WAS COMPLETED: 66774
RATE HOW EMOTIONALLY BOTHERED YOU ARE BY YOUR SKIN PROBLEM (FOR EXAMPLE, WORRY, EMBARRASSMENT, FRUSTRATION): 0 - NEVER BOTHERED
RATE HOW BOTHERED YOU ARE BY SYMPTOMS OF YOUR SKIN PROBLEM (EG, ITCHING, STINGING BURNING, HURTING OR SKIN IRRITATION): 0 - NEVER BOTHERED
ARE THERE EXCLUSIONS OR EXCEPTIONS FOR THE QUALITY OF LIFE ASSESSMENT: NO
RATE HOW BOTHERED YOU ARE BY EFFECTS OF YOUR SKIN PROBLEMS ON YOUR ACTIVITIES (EG, GOING OUT, ACCOMPLISHING WHAT YOU WANT, WORK ACTIVITIES OR YOUR RELATIONSHIPS WITH OTHERS): 0 - NEVER BOTHERED

## 2023-10-27 ASSESSMENT — ITCH NUMERIC RATING SCALE: HOW SEVERE IS YOUR ITCHING?: 0

## 2023-10-27 ASSESSMENT — DERMATOLOGY PATIENT ASSESSMENT: DO YOU HAVE ANY NEW OR CHANGING LESIONS: YES

## 2023-10-27 NOTE — PROGRESS NOTES
Subjective     Laure Barton is a 68 y.o. female who presents for the following: Lesion.   Patient in today for single lesion on bottom lip x2 months.  Patient describes as scabbing falling off and returning again.  Patient states today there is no scab present.  Patient states she has tried Chapstick to alleviate area.  Patient previously seen September 26, 2022 by Dr. Castanon for full body skin exam .       Review of Systems:  No other skin or systemic complaints other than what is documented elsewhere in the note.    The following portions of the chart were reviewed this encounter and updated as appropriate:       Skin Cancer History  No skin cancer on file.    Specialty Problems          Dermatology Problems    Hemangioma of skin and subcutaneous tissue    Hypertrophic scar    Keloid    Melanocytic nevi of trunk    Other hypertrophic disorders of the skin    Other melanin hyperpigmentation    Other seborrheic keratosis    Contusion of rib     Past Medical History:  Laure Barton  has a past medical history of Palpitations, Personal history of other diseases of the digestive system, Personal history of other endocrine, nutritional and metabolic disease, and Personal history of other specified conditions (12/26/2020).    Past Surgical History:  Laure Barton  has a past surgical history that includes Other surgical history (01/03/2022) and Other surgical history (03/25/2022).    Family History:  Patient family history includes Breast cancer in her mother and sister; Coronary artery disease in an other family member; Dementia in her mother and sister; Diabetes in her brother and father; Glaucoma in her mother; Hypertension in her mother; Osteoarthritis in her father; Pancreatic cancer in her mother; Sleep apnea in her sister; Sudden death in her father and sister; Uterine cancer in her paternal grandmother.    Social History:  Laure Barton  reports that she has never smoked. She  has never used smokeless tobacco. Alcohol use questions deferred to the physician. She reports that she does not use drugs.    Allergies:  Sulfa (sulfonamide antibiotics)    Current Medications / CAM's:    Current Outpatient Medications:     albuterol (ProAir HFA) 90 mcg/actuation inhaler, Inhale 2 puffs every 6 hours if needed for wheezing or shortness of breath., Disp: 18 g, Rfl: 0    anastrozole (Arimidex) 1 mg tablet, Take 1 tablet (1 mg total) by mouth once daily at bedtime., Disp: , Rfl:     cholecalciferol (Vitamin D-3) 50 MCG (2000 UT) tablet, Take by mouth., Disp: , Rfl:     fenofibrate (Triglide) 160 mg tablet, Take 1 tablet (160 mg) by mouth once daily., Disp: 90 tablet, Rfl: 1    levothyroxine (Synthroid, Levoxyl) 50 mcg tablet, Take 1 tablet (50 mcg) by mouth once daily., Disp: 90 tablet, Rfl: 3    metoprolol succinate XL (Toprol-XL) 50 mg 24 hr tablet, Take 1 tablet (50 mg) by mouth once daily., Disp: , Rfl:     rosuvastatin (Crestor) 10 mg tablet, Take 1 tablet (10 mg) by mouth once daily at bedtime., Disp: , Rfl:     venlafaxine XR (Effexor-XR) 37.5 mg 24 hr capsule, Take 1 capsule (37.5 mg) by mouth once daily., Disp: 90 capsule, Rfl: 3     Objective   Well appearing patient in no apparent distress; mood and affect are within normal limits.      Assessment/Plan

## 2023-12-07 DIAGNOSIS — E78.1 HYPERTRIGLYCERIDEMIA: ICD-10-CM

## 2023-12-07 RX ORDER — FENOFIBRATE 160 MG/1
160 TABLET ORAL DAILY
Qty: 30 TABLET | Refills: 0 | Status: SHIPPED | OUTPATIENT
Start: 2023-12-07 | End: 2024-01-15

## 2023-12-14 ENCOUNTER — HOSPITAL ENCOUNTER (OUTPATIENT)
Dept: RADIOLOGY | Facility: HOSPITAL | Age: 68
Discharge: HOME | End: 2023-12-14
Payer: MEDICARE

## 2023-12-14 DIAGNOSIS — D05.11 INTRADUCTAL CARCINOMA IN SITU OF RIGHT BREAST: ICD-10-CM

## 2023-12-14 DIAGNOSIS — Z85.3 PERSONAL HISTORY OF MALIGNANT NEOPLASM OF BREAST: ICD-10-CM

## 2023-12-14 DIAGNOSIS — Z08 ENCOUNTER FOR FOLLOW-UP EXAMINATION AFTER COMPLETED TREATMENT FOR MALIGNANT NEOPLASM: ICD-10-CM

## 2023-12-14 PROCEDURE — 77066 DX MAMMO INCL CAD BI: CPT | Performed by: STUDENT IN AN ORGANIZED HEALTH CARE EDUCATION/TRAINING PROGRAM

## 2023-12-14 PROCEDURE — G0279 TOMOSYNTHESIS, MAMMO: HCPCS | Performed by: STUDENT IN AN ORGANIZED HEALTH CARE EDUCATION/TRAINING PROGRAM

## 2023-12-14 PROCEDURE — 77066 DX MAMMO INCL CAD BI: CPT

## 2023-12-28 NOTE — PROGRESS NOTES
BREAST SURGERY NEW PATIENT VISIT    Subjective   Laure Barton is a 68 y.o. female referred by Dr. Pacheco  for follow-up of right breast DCIS.  She is doing well and has no complaints or concerns.  She is taking anastrozole and tolerating it without problems.    DCIS of the right breast diagnosed in 2022.  ER/IN positive  Right breast lumpectomy on 4/15/2022  Radiation therapy completed in 2022  Anastrozole started 2022      MEDICAL HISTORY; hypertension; high cholesterol; cardiomyopathy; factor V Leiden; depression with anxiety; hypothyroid,Hypotrophic obstructive cardiomyopathy s/p open heart surgery 2022, spine issues     SURGICAL HISTORY; C-sections, heart surgery; right lumpectomy 2022     REVIEW OF SYSTEMS;feeling tired, dry eyes, itchy eyes,loss of hearing, shortness of breath on exertion, joint pain, muscle weakness, joint stiffness, back pain,depression, rest is negative for 14 point review      FEMALE HISTORY: First menstrual cycle age 14; last menstrual cycle age 51;  1 para 2; age of first birth 34; breast-fed both children for more than 12 months.  Never took birth control     FAMILY HISTORY: Sister with breast cancer diagnosed in her 50s  at 69 with dementia  Paternal grandmother with ovarian cancer diagnosed in her 40s ( long ago, the patient never knew her grandmother)   Mother with possible pancreatic cancer in her 80's    Mammogram:   Bilateral mammogram done on 2023 which shows posttreatment changes in the right breast and no evidence of malignancy in either breast.  Category 2.  Radiology review: All images and reports were personally reviewed.     Non-smoker    GYN History:  Menarche: 15  Menopause: 52  HRT: No  Birth control use: No  Fertility treatment: No  Hysterectomy: No    G 1 P 2  Age at First Live Birth: 33  Breast-feeding: yes      Family history: Sister with breast cancer in her early 60s, ; paternal grandmother  with uterine cancer in her 40s,       Review of Systems   Constitutional symptoms: Denies generalized fatigue.  Denies weight change, fevers/chills, difficulty sleeping   Eyes: Denies double vision, glaucoma, cataracts.  Ear/nose/throat/mouth: Denies hearing changes, sore throat, sinus problems.  Cardiovascular: No chest pain.  Denies irregular heartbeat.  Denies ankle swelling.  Respiratory: No wheezing, cough, or shortness of breath.  Gastrointestinal: No abdominal pain,  No nausea/vomiting.  No indigestion/heartburn.  No change in bowel habits.  No constipation or diarrhea.   Genitourinary: No urinary incontinence.  No urinary frequency.  No painful urination.  Musculoskeletal: No bone pain, no muscle pain, no joint pain.   Integumentary: No rash. No masses.  No changes in moles.  No easy bruising.  Neurological: No headaches.  No tremors. No numbness/tingling.  Psychiatric: No anxiety. No depression.  Endocrine: No excessive thirst.  Not too hot or too cold.  Not tired or fatigued.    Hematological/lymphatic: No swollen glands or blood clotting problems.  No bruising.    PHYSICAL EXAM:    General: Alert and oriented x 3.  Mood and affect are appropriate.  HEENT: EOMI, PERRLA.   Neck: supple, no masses, no cervical adenopathy.  Cardiovascular: no lower extremity edema.  Regular rhythm.  Pulmonary: breathing non labored on room air.  Clear to auscultation.  GI: Abdomen soft, no masses. No hepatomegaly or splenomegaly.  Lymph nodes: No supraclavicular or axillary adenopathy bilaterally.  Musculoskeletal: Full range of motion in the upper extremities bilaterally.  Neuro: denies dizziness, tremors  Physical Exam  Chest:          Comments: Lymph node exam shows no cervical, supraclavicular, or axillary lymphadenopathy.  Breast exam shows symmetric breasts bilaterally with no skin changes, no dominant masses and no nipple discharge in either breast.        Assessment/Plan     DCIS of the right breast diagnosed  in March 2022.  ER/AK positive  Doing well.  Stable exam and bilateral mammogram.  Continue anastrozole.  Follow-up with me in December 2024 with a bilateral mammogram.      Andressa Bee MD

## 2024-01-09 ENCOUNTER — OFFICE VISIT (OUTPATIENT)
Dept: SURGICAL ONCOLOGY | Facility: CLINIC | Age: 69
End: 2024-01-09
Payer: MEDICARE

## 2024-01-09 VITALS
WEIGHT: 169.9 LBS | TEMPERATURE: 97.7 F | HEIGHT: 63 IN | SYSTOLIC BLOOD PRESSURE: 148 MMHG | HEART RATE: 74 BPM | BODY MASS INDEX: 30.11 KG/M2 | DIASTOLIC BLOOD PRESSURE: 77 MMHG

## 2024-01-09 DIAGNOSIS — D05.11 DUCTAL CARCINOMA IN SITU (DCIS) OF RIGHT BREAST: Primary | ICD-10-CM

## 2024-01-09 DIAGNOSIS — Z12.31 SCREENING MAMMOGRAM FOR HIGH-RISK PATIENT: ICD-10-CM

## 2024-01-09 PROCEDURE — 3077F SYST BP >= 140 MM HG: CPT | Performed by: SURGERY

## 2024-01-09 PROCEDURE — 99204 OFFICE O/P NEW MOD 45 MIN: CPT | Performed by: SURGERY

## 2024-01-09 PROCEDURE — 3078F DIAST BP <80 MM HG: CPT | Performed by: SURGERY

## 2024-01-09 PROCEDURE — 1036F TOBACCO NON-USER: CPT | Performed by: SURGERY

## 2024-01-09 PROCEDURE — 99214 OFFICE O/P EST MOD 30 MIN: CPT | Performed by: SURGERY

## 2024-01-09 PROCEDURE — 1126F AMNT PAIN NOTED NONE PRSNT: CPT | Performed by: SURGERY

## 2024-01-09 ASSESSMENT — PAIN SCALES - GENERAL: PAINLEVEL: 0-NO PAIN

## 2024-01-13 DIAGNOSIS — E78.1 HYPERTRIGLYCERIDEMIA: ICD-10-CM

## 2024-01-15 RX ORDER — FENOFIBRATE 160 MG/1
TABLET ORAL
Qty: 30 TABLET | Refills: 0 | Status: SHIPPED | OUTPATIENT
Start: 2024-01-15 | End: 2024-03-10

## 2024-01-24 ENCOUNTER — TELEMEDICINE (OUTPATIENT)
Dept: PRIMARY CARE | Facility: CLINIC | Age: 69
End: 2024-01-24
Payer: MEDICARE

## 2024-01-24 DIAGNOSIS — C50.919 MALIGNANT NEOPLASM OF FEMALE BREAST, UNSPECIFIED ESTROGEN RECEPTOR STATUS, UNSPECIFIED LATERALITY, UNSPECIFIED SITE OF BREAST (MULTI): ICD-10-CM

## 2024-01-24 DIAGNOSIS — D68.51 HETEROZYGOUS FACTOR V LEIDEN MUTATION (MULTI): ICD-10-CM

## 2024-01-24 DIAGNOSIS — L30.9 DERMATITIS: Primary | ICD-10-CM

## 2024-01-24 DIAGNOSIS — I42.2 CARDIOMYOPATHY, HYPERTROPHIC (MULTI): ICD-10-CM

## 2024-01-24 PROCEDURE — 99213 OFFICE O/P EST LOW 20 MIN: CPT | Performed by: INTERNAL MEDICINE

## 2024-01-24 RX ORDER — TRIAMCINOLONE ACETONIDE 1 MG/G
OINTMENT TOPICAL 2 TIMES DAILY PRN
Qty: 15 G | Refills: 0 | Status: SHIPPED | OUTPATIENT
Start: 2024-01-24

## 2024-01-24 NOTE — PROGRESS NOTES
Subjective   Patient ID: Laure Barton is a 68 y.o. female who presents for Rash (Video visit for skin issues).    HPI   C/O chronic redness and irritation on both elbows.       Assessment/Plan   Diagnoses and all orders for this visit:  Dermatitis  -     triamcinolone (Kenalog) 0.1 % ointment; Apply topically 2 times a day as needed for irritation or rash.  Heterozygous factor V Leiden mutation (CMS/HCC)  Cardiomyopathy, hypertrophic (CMS/HCC)  Malignant neoplasm of female breast, unspecified estrogen receptor status, unspecified laterality, unspecified site of breast (CMS/HCC)    Aquaphor topically to both elbows, in addition to Triamcinolone close follow up.

## 2024-02-21 ENCOUNTER — APPOINTMENT (OUTPATIENT)
Dept: PRIMARY CARE | Facility: CLINIC | Age: 69
End: 2024-02-21
Payer: MEDICARE

## 2024-03-09 DIAGNOSIS — E78.1 HYPERTRIGLYCERIDEMIA: ICD-10-CM

## 2024-03-10 RX ORDER — FENOFIBRATE 160 MG/1
160 TABLET ORAL DAILY
Qty: 30 TABLET | Refills: 0 | Status: SHIPPED | OUTPATIENT
Start: 2024-03-10 | End: 2024-04-12 | Stop reason: SDUPTHER

## 2024-03-19 DIAGNOSIS — I42.2 CARDIOMYOPATHY, HYPERTROPHIC (MULTI): Primary | ICD-10-CM

## 2024-03-19 RX ORDER — METOPROLOL SUCCINATE 50 MG/1
50 TABLET, EXTENDED RELEASE ORAL DAILY
Qty: 90 TABLET | Refills: 3 | Status: SHIPPED | OUTPATIENT
Start: 2024-03-19

## 2024-03-28 ENCOUNTER — APPOINTMENT (OUTPATIENT)
Dept: DERMATOLOGY | Facility: CLINIC | Age: 69
End: 2024-03-28
Payer: MEDICARE

## 2024-04-01 ENCOUNTER — APPOINTMENT (OUTPATIENT)
Dept: GASTROENTEROLOGY | Facility: CLINIC | Age: 69
End: 2024-04-01
Payer: MEDICARE

## 2024-04-09 ENCOUNTER — APPOINTMENT (OUTPATIENT)
Dept: GASTROENTEROLOGY | Facility: CLINIC | Age: 69
End: 2024-04-09
Payer: MEDICARE

## 2024-04-12 ENCOUNTER — LAB (OUTPATIENT)
Dept: LAB | Facility: LAB | Age: 69
End: 2024-04-12
Payer: MEDICARE

## 2024-04-12 ENCOUNTER — OFFICE VISIT (OUTPATIENT)
Dept: PRIMARY CARE | Facility: CLINIC | Age: 69
End: 2024-04-12
Payer: MEDICARE

## 2024-04-12 VITALS
WEIGHT: 177 LBS | SYSTOLIC BLOOD PRESSURE: 154 MMHG | BODY MASS INDEX: 31.36 KG/M2 | OXYGEN SATURATION: 97 % | HEART RATE: 81 BPM | HEIGHT: 63 IN | DIASTOLIC BLOOD PRESSURE: 90 MMHG

## 2024-04-12 DIAGNOSIS — Z00.00 MEDICARE ANNUAL WELLNESS VISIT, SUBSEQUENT: Primary | ICD-10-CM

## 2024-04-12 DIAGNOSIS — Z12.11 ENCOUNTER FOR SCREENING FOR MALIGNANT NEOPLASM OF COLON: ICD-10-CM

## 2024-04-12 DIAGNOSIS — E78.5 HYPERLIPIDEMIA, UNSPECIFIED HYPERLIPIDEMIA TYPE: ICD-10-CM

## 2024-04-12 DIAGNOSIS — R73.01 IFG (IMPAIRED FASTING GLUCOSE): ICD-10-CM

## 2024-04-12 DIAGNOSIS — E78.1 HYPERTRIGLYCERIDEMIA: ICD-10-CM

## 2024-04-12 DIAGNOSIS — Z11.59 NEED FOR HEPATITIS C SCREENING TEST: ICD-10-CM

## 2024-04-12 DIAGNOSIS — E03.9 HYPOTHYROIDISM, UNSPECIFIED TYPE: ICD-10-CM

## 2024-04-12 DIAGNOSIS — I10 HYPERTENSION, UNSPECIFIED TYPE: ICD-10-CM

## 2024-04-12 DIAGNOSIS — Z78.0 ASYMPTOMATIC MENOPAUSE: ICD-10-CM

## 2024-04-12 DIAGNOSIS — F41.8 DEPRESSION WITH ANXIETY: ICD-10-CM

## 2024-04-12 DIAGNOSIS — Z00.00 MEDICARE ANNUAL WELLNESS VISIT, SUBSEQUENT: ICD-10-CM

## 2024-04-12 LAB
ALBUMIN SERPL BCP-MCNC: 4.3 G/DL (ref 3.4–5)
ALP SERPL-CCNC: 58 U/L (ref 33–136)
ALT SERPL W P-5'-P-CCNC: 18 U/L (ref 7–45)
ANION GAP SERPL CALC-SCNC: 14 MMOL/L (ref 10–20)
AST SERPL W P-5'-P-CCNC: 17 U/L (ref 9–39)
BILIRUB SERPL-MCNC: 0.4 MG/DL (ref 0–1.2)
BUN SERPL-MCNC: 16 MG/DL (ref 6–23)
CALCIUM SERPL-MCNC: 9.6 MG/DL (ref 8.6–10.6)
CHLORIDE SERPL-SCNC: 103 MMOL/L (ref 98–107)
CHOLEST SERPL-MCNC: 150 MG/DL (ref 0–199)
CHOLESTEROL/HDL RATIO: 3.6
CO2 SERPL-SCNC: 27 MMOL/L (ref 21–32)
CREAT SERPL-MCNC: 0.74 MG/DL (ref 0.5–1.05)
CREAT UR-MCNC: 84.4 MG/DL (ref 20–320)
EGFRCR SERPLBLD CKD-EPI 2021: 89 ML/MIN/1.73M*2
ERYTHROCYTE [DISTWIDTH] IN BLOOD BY AUTOMATED COUNT: 12.9 % (ref 11.5–14.5)
EST. AVERAGE GLUCOSE BLD GHB EST-MCNC: 108 MG/DL
GLUCOSE SERPL-MCNC: 97 MG/DL (ref 74–99)
HBA1C MFR BLD: 5.4 %
HCT VFR BLD AUTO: 36.1 % (ref 36–46)
HCV AB SER QL: NONREACTIVE
HDLC SERPL-MCNC: 41.3 MG/DL
HGB BLD-MCNC: 12.4 G/DL (ref 12–16)
LDLC SERPL CALC-MCNC: 62 MG/DL
MCH RBC QN AUTO: 31.2 PG (ref 26–34)
MCHC RBC AUTO-ENTMCNC: 34.3 G/DL (ref 32–36)
MCV RBC AUTO: 91 FL (ref 80–100)
MICROALBUMIN UR-MCNC: 7.7 MG/L
MICROALBUMIN/CREAT UR: 9.1 UG/MG CREAT
NON HDL CHOLESTEROL: 109 MG/DL (ref 0–149)
NRBC BLD-RTO: 0 /100 WBCS (ref 0–0)
PLATELET # BLD AUTO: 259 X10*3/UL (ref 150–450)
POTASSIUM SERPL-SCNC: 4.8 MMOL/L (ref 3.5–5.3)
PROT SERPL-MCNC: 7 G/DL (ref 6.4–8.2)
RBC # BLD AUTO: 3.97 X10*6/UL (ref 4–5.2)
SODIUM SERPL-SCNC: 139 MMOL/L (ref 136–145)
TRIGL SERPL-MCNC: 232 MG/DL (ref 0–149)
TSH SERPL-ACNC: 2.33 MIU/L (ref 0.44–3.98)
VLDL: 46 MG/DL (ref 0–40)
WBC # BLD AUTO: 5.5 X10*3/UL (ref 4.4–11.3)

## 2024-04-12 PROCEDURE — 86803 HEPATITIS C AB TEST: CPT

## 2024-04-12 PROCEDURE — 1160F RVW MEDS BY RX/DR IN RCRD: CPT | Performed by: INTERNAL MEDICINE

## 2024-04-12 PROCEDURE — G0439 PPPS, SUBSEQ VISIT: HCPCS | Performed by: INTERNAL MEDICINE

## 2024-04-12 PROCEDURE — 82043 UR ALBUMIN QUANTITATIVE: CPT

## 2024-04-12 PROCEDURE — 3080F DIAST BP >= 90 MM HG: CPT | Performed by: INTERNAL MEDICINE

## 2024-04-12 PROCEDURE — 1170F FXNL STATUS ASSESSED: CPT | Performed by: INTERNAL MEDICINE

## 2024-04-12 PROCEDURE — 80053 COMPREHEN METABOLIC PANEL: CPT

## 2024-04-12 PROCEDURE — 1157F ADVNC CARE PLAN IN RCRD: CPT | Performed by: INTERNAL MEDICINE

## 2024-04-12 PROCEDURE — 85027 COMPLETE CBC AUTOMATED: CPT

## 2024-04-12 PROCEDURE — 36415 COLL VENOUS BLD VENIPUNCTURE: CPT

## 2024-04-12 PROCEDURE — 82570 ASSAY OF URINE CREATININE: CPT

## 2024-04-12 PROCEDURE — 84443 ASSAY THYROID STIM HORMONE: CPT

## 2024-04-12 PROCEDURE — 1159F MED LIST DOCD IN RCRD: CPT | Performed by: INTERNAL MEDICINE

## 2024-04-12 PROCEDURE — 80061 LIPID PANEL: CPT

## 2024-04-12 PROCEDURE — 83036 HEMOGLOBIN GLYCOSYLATED A1C: CPT

## 2024-04-12 PROCEDURE — 3077F SYST BP >= 140 MM HG: CPT | Performed by: INTERNAL MEDICINE

## 2024-04-12 PROCEDURE — 1036F TOBACCO NON-USER: CPT | Performed by: INTERNAL MEDICINE

## 2024-04-12 RX ORDER — ROSUVASTATIN CALCIUM 10 MG/1
10 TABLET, COATED ORAL NIGHTLY
Qty: 90 TABLET | Refills: 3 | Status: SHIPPED | OUTPATIENT
Start: 2024-04-12

## 2024-04-12 RX ORDER — LOSARTAN POTASSIUM 25 MG/1
25 TABLET ORAL DAILY
Qty: 30 TABLET | Refills: 11 | Status: SHIPPED | OUTPATIENT
Start: 2024-04-12 | End: 2025-04-12

## 2024-04-12 RX ORDER — VENLAFAXINE HYDROCHLORIDE 37.5 MG/1
37.5 CAPSULE, EXTENDED RELEASE ORAL DAILY
Qty: 90 CAPSULE | Refills: 3 | Status: SHIPPED | OUTPATIENT
Start: 2024-04-12

## 2024-04-12 RX ORDER — LEVOTHYROXINE SODIUM 50 UG/1
50 TABLET ORAL
Qty: 90 TABLET | Refills: 3 | Status: SHIPPED | OUTPATIENT
Start: 2024-04-12

## 2024-04-12 RX ORDER — FENOFIBRATE 160 MG/1
160 TABLET ORAL DAILY
Qty: 90 TABLET | Refills: 3 | Status: SHIPPED | OUTPATIENT
Start: 2024-04-12

## 2024-04-12 ASSESSMENT — ACTIVITIES OF DAILY LIVING (ADL)
DRESSING: INDEPENDENT
GROCERY_SHOPPING: INDEPENDENT
MANAGING_FINANCES: INDEPENDENT
TAKING_MEDICATION: INDEPENDENT
DOING_HOUSEWORK: INDEPENDENT
BATHING: INDEPENDENT

## 2024-04-12 NOTE — PROGRESS NOTES
"Subjective   Reason for Visit: Laure Barton is an 68 y.o. female here for a Medicare Wellness visit.     Past Medical, Surgical, and Family History reviewed and updated in chart.    Reviewed all medications by prescribing practitioner or clinical pharmacist (such as prescriptions, OTCs, herbal therapies and supplements) and documented in the medical record.    HPI  The patient presents for an annual wellness exam with no concerns.    Patient Care Team:  Sana Starr MD as PCP - General (Internal Medicine)  Sana Starr MD as PCP - [facility] Attributed Provider     Review of Systems  Weight gain  Stress exposure   Objective   Vitals:  /90   Pulse 81   Ht 1.6 m (5' 3\")   Wt 80.3 kg (177 lb)   SpO2 97%   BMI 31.35 kg/m²       Physical Exam  NAD. Cooperative.  HEENT: WNL  Neck: WNL  Lungs CTA  Heart: RRR  Abdomen: WNL  Musculoskeletal system: WNL  Neurologic exam: WNL    Assessment/Plan   Problem List Items Addressed This Visit       Depression with anxiety    Relevant Medications    venlafaxine XR (Effexor-XR) 37.5 mg 24 hr capsule    Hyperlipidemia    Relevant Medications    rosuvastatin (Crestor) 10 mg tablet    Other Relevant Orders    Lipid panel    CT cardiac scoring wo IV contrast    Hypertriglyceridemia    Overview     Formatting of this note might be different from the original. TG's as high as 900's         Relevant Medications    fenofibrate (Triglide) 160 mg tablet    Hypothyroidism    Relevant Medications    levothyroxine (Synthroid, Levoxyl) 50 mcg tablet    Other Relevant Orders    TSH    CBC    IFG (impaired fasting glucose)    Relevant Orders    Hemoglobin A1C    Hypertension    Relevant Medications    losartan (Cozaar) 25 mg tablet     Other Visit Diagnoses       Medicare annual wellness visit, subsequent    -  Primary    Relevant Orders    Comprehensive metabolic panel    Need for hepatitis C screening test        Relevant Orders    Hepatitis C Antibody    Encounter for " screening for malignant neoplasm of colon        Asymptomatic menopause        Relevant Orders    XR DEXA bone density

## 2024-04-29 ENCOUNTER — HOSPITAL ENCOUNTER (OUTPATIENT)
Dept: RADIOLOGY | Facility: CLINIC | Age: 69
Discharge: HOME | End: 2024-04-29
Payer: MEDICARE

## 2024-04-29 ENCOUNTER — OFFICE VISIT (OUTPATIENT)
Dept: GASTROENTEROLOGY | Facility: CLINIC | Age: 69
End: 2024-04-29
Payer: MEDICARE

## 2024-04-29 VITALS — BODY MASS INDEX: 31.71 KG/M2 | HEART RATE: 75 BPM | WEIGHT: 179 LBS | OXYGEN SATURATION: 97 % | HEIGHT: 63 IN

## 2024-04-29 DIAGNOSIS — K59.04 CHRONIC IDIOPATHIC CONSTIPATION: ICD-10-CM

## 2024-04-29 DIAGNOSIS — K59.04 CHRONIC IDIOPATHIC CONSTIPATION: Primary | ICD-10-CM

## 2024-04-29 DIAGNOSIS — R14.0 ABDOMINAL BLOATING: ICD-10-CM

## 2024-04-29 PROCEDURE — 1159F MED LIST DOCD IN RCRD: CPT | Performed by: INTERNAL MEDICINE

## 2024-04-29 PROCEDURE — 74019 RADEX ABDOMEN 2 VIEWS: CPT

## 2024-04-29 PROCEDURE — 1160F RVW MEDS BY RX/DR IN RCRD: CPT | Performed by: INTERNAL MEDICINE

## 2024-04-29 PROCEDURE — 74021 RADEX ABDOMEN 3+ VIEWS: CPT | Performed by: STUDENT IN AN ORGANIZED HEALTH CARE EDUCATION/TRAINING PROGRAM

## 2024-04-29 PROCEDURE — 1036F TOBACCO NON-USER: CPT | Performed by: INTERNAL MEDICINE

## 2024-04-29 PROCEDURE — 99214 OFFICE O/P EST MOD 30 MIN: CPT | Performed by: INTERNAL MEDICINE

## 2024-04-29 PROCEDURE — 1157F ADVNC CARE PLAN IN RCRD: CPT | Performed by: INTERNAL MEDICINE

## 2024-04-29 NOTE — PROGRESS NOTES
Subjective     History of Present Illness:   67yo with breast cancer sp lumpectomy and radiation on anastrozole, HTN, DL, factor V Leiden by family history no prior thrombosis; depression with anxiety; hypothyroid,Hypotrophic obstructive cardiomyopathy s/p myectomy February 2022, spine issues seen for follow up. Last seen 4/2023 to discuss colonoscopy; also with constipation . Recommended to increase dietary fiber and add miralax if needed.    Still with multiple smaller movements but incomplete. Tried benefiber generic without improvement.  Occasionally with bad cramps, then diarrhea with relief. Feels may need nausea.  + bloating with at times distention, no early satiety, no nausea.  Stools range from small pieces to hard balls to formed stool.   No blood per rectum  Did not try miralax in the past.  No unintentional weight loss.  TSH wnl  No new medications    Colonoscopy 6/2023- 3mm TA, diverticulosis, int hemorrhoids  Colonoscopy 12/2015- normal to TI        Mother- hiatal hernia  No family history of colon ca or polyps that she is aware- per last colonoscopy report fam hx of adenomatous polyps.      Allergies  Allergies   Allergen Reactions    Sulfa (Sulfonamide Antibiotics) Rash       Medications  Current Outpatient Medications   Medication Instructions    albuterol (ProAir HFA) 90 mcg/actuation inhaler 2 puffs, inhalation, Every 6 hours PRN    anastrozole (ARIMIDEX) 1 mg, oral, Nightly    cholecalciferol (Vitamin D-3) 50 MCG (2000 UT) tablet oral    fenofibrate (TRIGLIDE) 160 mg, oral, Daily    levothyroxine (SYNTHROID, LEVOXYL) 50 mcg, oral, Daily before breakfast    losartan (COZAAR) 25 mg, oral, Daily    metoprolol succinate XL (TOPROL-XL) 50 mg, oral, Daily    rosuvastatin (CRESTOR) 10 mg, oral, Nightly    triamcinolone (Kenalog) 0.1 % ointment Topical, 2 times daily PRN    venlafaxine XR (EFFEXOR-XR) 37.5 mg, oral, Daily        Objective   There were no vitals taken for this visit.   Physical  Exam  Vitals reviewed.   Constitutional:       General: She is awake.   Cardiovascular:      Rate and Rhythm: Normal rate and regular rhythm.   Pulmonary:      Effort: Pulmonary effort is normal.      Breath sounds: Normal breath sounds.   Abdominal:      General: Bowel sounds are normal.      Palpations: Abdomen is soft.      Tenderness: There is no abdominal tenderness.   Neurological:      Mental Status: She is alert and oriented to person, place, and time.   Psychiatric:         Attention and Perception: Attention and perception normal.         Behavior: Behavior normal.               Assessment/Plan:      69yo with breast cancer sp lumpectomy and radiation on anastrozole, HTN, DL, factor V Leiden by family history no prior thrombosis; depression with anxiety; hypothyroid,Hypotrophic obstructive cardiomyopathy s/p myectomy February 2022, spine issues seen for constipation, bloating. Possibly large fecal load and will check KUB.  Recommend starting with miralax daily. May need anorectal testing if no improvement with laxatives.  Per pt request will place nutrition referral.        Check kub, may need clean out  Miralax daily  Nutrition referral per pt request  Follow up in 2-3 mo       Jenny Almanza MD

## 2024-05-08 ENCOUNTER — OFFICE VISIT (OUTPATIENT)
Dept: DERMATOLOGY | Facility: CLINIC | Age: 69
End: 2024-05-08
Payer: MEDICARE

## 2024-05-08 DIAGNOSIS — L82.0 INFLAMED SEBORRHEIC KERATOSIS: ICD-10-CM

## 2024-05-08 DIAGNOSIS — L81.4 LENTIGO: ICD-10-CM

## 2024-05-08 DIAGNOSIS — D22.9 MULTIPLE BENIGN NEVI: Primary | ICD-10-CM

## 2024-05-08 DIAGNOSIS — Z12.83 SCREENING EXAM FOR SKIN CANCER: ICD-10-CM

## 2024-05-08 DIAGNOSIS — L57.8 ACTINIC SKIN DAMAGE: ICD-10-CM

## 2024-05-08 DIAGNOSIS — L82.1 SEBORRHEIC KERATOSIS: ICD-10-CM

## 2024-05-08 PROCEDURE — 17110 DESTRUCTION B9 LES UP TO 14: CPT | Performed by: DERMATOLOGY

## 2024-05-08 PROCEDURE — 1157F ADVNC CARE PLAN IN RCRD: CPT | Performed by: DERMATOLOGY

## 2024-05-08 PROCEDURE — 1160F RVW MEDS BY RX/DR IN RCRD: CPT | Performed by: DERMATOLOGY

## 2024-05-08 PROCEDURE — 1159F MED LIST DOCD IN RCRD: CPT | Performed by: DERMATOLOGY

## 2024-05-08 PROCEDURE — 1036F TOBACCO NON-USER: CPT | Performed by: DERMATOLOGY

## 2024-05-08 PROCEDURE — 99213 OFFICE O/P EST LOW 20 MIN: CPT | Performed by: DERMATOLOGY

## 2024-05-08 NOTE — PROGRESS NOTES
Subjective     Laure Barton is a 68 y.o. female who presents for the following: Skin Check.     Review of Systems:  No other skin or systemic complaints other than what is documented elsewhere in the note.    The following portions of the chart were reviewed this encounter and updated as appropriate:  Tobacco  Allergies  Meds  Problems  Med Hx  Surg Hx  Fam Hx         Skin Cancer History  No skin cancer on file.      Specialty Problems          Dermatology Problems    Hemangioma of skin and subcutaneous tissue    Hypertrophic scar    Keloid    Melanocytic nevi of trunk    Other hypertrophic disorders of the skin    Other melanin hyperpigmentation    Other seborrheic keratosis    Contusion of rib        Objective   Well appearing patient in no apparent distress; mood and affect are within normal limits.    A full examination was performed including scalp, head, eyes, ears, nose, lips, neck, chest, axillae, abdomen, back, buttocks, bilateral upper extremities, bilateral lower extremities, hands, feet, fingers, toes, fingernails, and toenails. All findings within normal limits unless otherwise noted below.    Assessment/Plan   1. Multiple benign nevi  Brown and tan macules and papules with reassuring findings on dermoscopy    -These lesions have benign, reassuring patterns on dermoscopy  -Recommend continued self observation, and to contact the office if any changes in nevi are noticed    2. Lentigo  Tan macules    -Benign appearing on exam  -Reassurance, recommend observation    3. Seborrheic keratosis  Stuck on, waxy macule(s)/papule(s)/plaque(s) with comedo-like openings and milia like cysts    Underneath breasts and around eyes. Interested in cosmetic removal. May not be able to remove all around the eyes. She will consider and send [employer] message if she is interested and whether she wants resident discount or not    -Discussed the nature of the diagnosis  -Reassurance, recommend continued  observation    4. Actinic skin damage  Background of photodamage with hyper- and hypo-pigmented macules on the skin    Actinic keratosis treated on lip by Indira Santana is resolved    5. Screening exam for skin cancer  As part of a routine Full Skin Exam, a genital examination with the presence of a chaperone was offered. The patient agreed to the exam and declined the chaperone.       Full body skin exam  -No lesions concerning for malignancy on the remainder the skin exam today   - The ugly duckling sign was discussed. Monitor for any skin lesions that are different in color, shape, or size than others on body  -Sun protection was discussed. Recommend SPF 30+, hats with brims, sun protective clothing, and avoiding sun exposure between 10 AM and 2 PM whenever possible  -Recommend regular skin exams or sooner if new or changing lesions       6. Inflamed seborrheic keratosis  Left Upper Arm - Posterior  Stuck-on, waxy macule(s)/papule(s)/plaque(s) with comedo-like openings and milia-like cysts with surrounding erythema and crusting    -Patient requests cryotherapy today for these clinically inflamed lesions  -Possible side effects of liquid nitrogen treatment reviewed including formation of blisters, crusting, tenderness, scar, and discoloration which may be permanent.    Destr of lesion - Left Upper Arm - Posterior  Complexity: simple    Destruction method: cryotherapy    Informed consent: discussed and consent obtained    Lesion destroyed using liquid nitrogen: Yes    Outcome: patient tolerated procedure well with no complications

## 2024-05-10 ENCOUNTER — HOSPITAL ENCOUNTER (OUTPATIENT)
Dept: RADIATION ONCOLOGY | Facility: CLINIC | Age: 69
Setting detail: RADIATION/ONCOLOGY SERIES
Discharge: HOME | End: 2024-05-10
Payer: MEDICARE

## 2024-05-10 VITALS
OXYGEN SATURATION: 94 % | HEART RATE: 67 BPM | SYSTOLIC BLOOD PRESSURE: 145 MMHG | TEMPERATURE: 97.3 F | DIASTOLIC BLOOD PRESSURE: 82 MMHG | RESPIRATION RATE: 18 BRPM

## 2024-05-10 DIAGNOSIS — Z79.811 AROMATASE INHIBITOR USE: ICD-10-CM

## 2024-05-10 DIAGNOSIS — Z90.11 STATUS POST PARTIAL MASTECTOMY OF RIGHT BREAST: ICD-10-CM

## 2024-05-10 DIAGNOSIS — R93.5 ABNORMAL X-RAY OF ABDOMEN: Primary | ICD-10-CM

## 2024-05-10 DIAGNOSIS — D05.11 DUCTAL CARCINOMA IN SITU (DCIS) OF RIGHT BREAST: Primary | ICD-10-CM

## 2024-05-10 PROCEDURE — 99213 OFFICE O/P EST LOW 20 MIN: CPT | Performed by: NURSE PRACTITIONER

## 2024-05-10 RX ORDER — ANASTROZOLE 1 MG/1
1 TABLET ORAL NIGHTLY
Qty: 90 TABLET | Refills: 3 | Status: SHIPPED | OUTPATIENT
Start: 2024-05-10

## 2024-05-10 ASSESSMENT — ENCOUNTER SYMPTOMS
LOSS OF SENSATION IN FEET: 0
DEPRESSION: 0
OCCASIONAL FEELINGS OF UNSTEADINESS: 0

## 2024-05-10 ASSESSMENT — PAIN SCALES - GENERAL: PAINLEVEL: 0-NO PAIN

## 2024-05-10 NOTE — PROGRESS NOTES
Radiation Oncology Follow-Up    Patient Name:  Laure Barton  MRN:  [id number]  :  1955    Referring Provider: No ref. 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      Treatment Summary:     - Routine screening mammogram on 2020, which revealed a small group of calcifications in the lower inner quadrant of the right breast which had increased in number.  She underwent a diagnostic right mammogram on 2020 which revealed the calcifications  to probably be benign and a 6-month follow-up was recommended.  This was performed 3/8/2021.  The calcifications were felt to be stable.  She underwent bilateral mammograms on 2021.  The calcifications had slightly increased in number.       - On 1/3/2022 she underwent a right breast core biopsy.  Pathology revealed a 0.16 cm ductal carcinoma in situ, grade 2.  Estrogen receptors stained positive at greater than 95%.  Progesterone receptors stained positive at 60%.  She developed a hypertrophic  obstructive cardiomyopathy and underwent a heart septal myectomy on 2022.  This revealed myocyte hypertrophy.       - On 4/15/2022 she underwent a right partial mastectomy.  Pathology revealed a 0.16 cm minute focus of ductal carcinoma in situ of the solid subtype, nuclear grade 1.  The resection margins were negative at greater than 2 mm.     - 7/15/22 - 22: Partial right breast radiation consisting of a total dose of 30 Gy given in 5 fractions of 6 Gy per  fraction.     SUBJECTIVE  History of Present Illness:   Laure Barton is here for routine radiation follow up. She is doing well. Breast well healed post treatment and skin is intact. No palpable findings on self exam. No swelling of right arm or difficulty with ROM. She continues on anastrozole and no adverse effects. She does not take calcium supplements but takes Vit D when she remembers. Energy level is baseline. Denies HA, fever, chills, cough, SOB, chest pain, N/V or bony pain. Mammogram in December 2023 without evidence of malignancy. DEXA scan normal bone density. Next scan due October 2024.    Review of Systems:    Review of Systems   All other systems reviewed and are negative.    Performance Status:   The Karnofsky performance scale today is 100, Fully active, able to carry on all pre-disease performed without restriction (ECOG equivalent 0).      OBJECTIVE    Current Outpatient Medications:     albuterol (ProAir HFA) 90 mcg/actuation inhaler, Inhale 2 puffs every 6 hours if needed for wheezing or shortness of breath., Disp: 18 g, Rfl: 0    anastrozole (Arimidex) 1 mg tablet, TAKE ONE TABLET BY MOUTH AT BEDTIME, Disp: 30 tablet, Rfl: 0    cholecalciferol (Vitamin D-3) 50 MCG (2000 UT) tablet, Take by mouth., Disp: , Rfl:     fenofibrate (Triglide) 160 mg tablet, Take 1 tablet (160 mg) by mouth once daily., Disp: 90 tablet, Rfl: 3    levothyroxine (Synthroid, Levoxyl) 50 mcg tablet, Take 1 tablet (50 mcg) by mouth once daily in the morning. Take before meals., Disp: 90 tablet, Rfl: 3    losartan (Cozaar) 25 mg tablet, Take 1 tablet (25 mg) by mouth once daily., Disp: 30 tablet, Rfl: 11    metoprolol succinate XL (Toprol-XL) 50 mg 24 hr tablet, Take 1 tablet (50 mg) by mouth once daily., Disp: 90 tablet, Rfl: 3    rosuvastatin (Crestor) 10 mg tablet, Take 1 tablet (10 mg) by mouth once daily at bedtime., Disp: 90 tablet, Rfl: 3    triamcinolone (Kenalog) 0.1 % ointment, Apply topically 2 times a day as  needed for irritation or rash., Disp: 15 g, Rfl: 0    venlafaxine XR (Effexor-XR) 37.5 mg 24 hr capsule, Take 1 capsule (37.5 mg) by mouth once daily., Disp: 90 capsule, Rfl: 3     Physical Exam:  Physical Exam  Vitals reviewed.   Constitutional:       Appearance: Normal appearance.   HENT:      Head: Normocephalic and atraumatic.      Nose: Nose normal.      Mouth/Throat:      Mouth: Mucous membranes are moist.      Pharynx: Oropharynx is clear.   Eyes:      Conjunctiva/sclera: Conjunctivae normal.      Pupils: Pupils are equal, round, and reactive to light.   Cardiovascular:      Rate and Rhythm: Normal rate and regular rhythm.   Pulmonary:      Effort: Pulmonary effort is normal.   Chest:   Breasts:     Right: No swelling, inverted nipple, mass, nipple discharge or skin change.      Left: No swelling, inverted nipple, mass, nipple discharge or skin change.   Abdominal:      Palpations: Abdomen is soft.   Musculoskeletal:         General: Normal range of motion.      Cervical back: Normal range of motion and neck supple.   Lymphadenopathy:      Cervical: No cervical adenopathy.      Upper Body:      Right upper body: No supraclavicular or axillary adenopathy.      Left upper body: No supraclavicular or axillary adenopathy.   Skin:     General: Skin is warm and dry.   Neurological:      General: No focal deficit present.      Mental Status: She is alert and oriented to person, place, and time.   Psychiatric:         Mood and Affect: Mood normal.         Behavior: Behavior normal.         RESULTS:  XR abdomen 2 views supine and erect or decub    Result Date: 5/1/2024  Interpreted By:  Neisha Gonzalez, STUDY: XR ABDOMEN 2 VIEWS SUPINE AND ERECT OR DECUB; ;  4/29/2024 10:36 am   INDICATION: Signs/Symptoms:constipation, bloating.   COMPARISON: None.   ACCESSION NUMBER(S): [id number]   ORDERING CLINICIAN: CHAYITO KIRKPATRICK   FINDINGS: There is small to moderate volume stool in the colon. Otherwise bowel gas pattern is  nonobstructive. There is craniocaudal increase in dimension of right lobe of liver measuring up to 25 cm, which could be related to Riedel's lobe versus hepatomegaly. No suspicious calcifications are noted in the abdomen. There are few scattered phleboliths in the pelvis. There are radiopaque leads projecting over the lower mediastinum in the center which are only partially included in the field of view and are incompletely evaluated. Otherwise lung bases are clear. No acute osseous abnormality. Mild-to-moderate discogenic degenerative changes of the lower lumbar spine are noted.       1. Nonspecific nonobstructive bowel gas pattern. Small to moderate volume stool in the colon. 2. Elongated right lobe of liver measuring up to 25 cm in CC dimension, which could be related to Riedel's lobe versus hepatomegaly. Recommend correlation with liver function tests.       MACRO: None   Signed by: Neisha Gonzalez 5/1/2024 9:16 AM Dictation workstation:   [id number]     Narrative & Impression   Interpreted By:  Hector Carreon and Barbat Antonio   STUDY:  BI MAMMO BILATERAL DIAGNOSTIC TOMOSYNTHESIS;  12/14/2023 12:09 pm      ACCESSION NUMBER(S):  [id number]      ORDERING CLINICIAN:  OSCAR NEWMAN      INDICATION:  Screening. Personal history of right breast cancer status post  lumpectomy with radiation (April 2022). Strong family history of  breast cancer.      COMPARISON:  Bilateral diagnostic mammogram 12/08/2022, 12/27/2021. Right  diagnostic mammogram 03/08/2021 and 08/12/2020. Screening mammogram  07/31/2020.      FINDINGS:  2D and tomosynthesis images were reviewed at 1 mm slice thickness.      Density:  There are areas of scattered fibroglandular tissue.      Postoperative scarring is noted at the central medial right breast at  posterior depth, less prominent compared to prior exam. No suspicious  masses or calcifications are identified.      IMPRESSION:  No mammographic evidence of malignancy. Posttreatment changes of  the  right breast.      BI-RADS CATEGORY:      BI-RADS Category:  2 Benign.  Recommendation:  Routine Screening Mammogram in 1 Year.  Recommended Date:  1 Year.  Laterality:  Bilateral.         ASSESSMENT/PLAN:  68 y.o. female with DCIS of right breast s/p breast conserving surgery followed by partial breast radiation. She is doing well and cosmesis is excellent. Mammogram and FUV with Dr. Bee in January 2025. RX refill for anastrozole sent to her pharmacy. Radiation follow up in 12 mo. Call with any concerns.     Urvashi Haines CNP  [phone]

## 2024-05-23 ENCOUNTER — LAB (OUTPATIENT)
Dept: LAB | Facility: LAB | Age: 69
End: 2024-05-23
Payer: MEDICARE

## 2024-05-23 DIAGNOSIS — R14.0 ABDOMINAL BLOATING: Primary | ICD-10-CM

## 2024-05-23 DIAGNOSIS — K59.04 CHRONIC IDIOPATHIC CONSTIPATION: ICD-10-CM

## 2024-05-23 DIAGNOSIS — R14.0 ABDOMINAL BLOATING: ICD-10-CM

## 2024-05-23 LAB
BUN SERPL-MCNC: 19 MG/DL (ref 6–23)
CREAT SERPL-MCNC: 0.84 MG/DL (ref 0.5–1.05)
EGFRCR SERPLBLD CKD-EPI 2021: 77 ML/MIN/1.73M*2

## 2024-05-23 PROCEDURE — 36415 COLL VENOUS BLD VENIPUNCTURE: CPT

## 2024-05-23 PROCEDURE — 84520 ASSAY OF UREA NITROGEN: CPT

## 2024-05-23 PROCEDURE — 82565 ASSAY OF CREATININE: CPT

## 2024-05-28 ENCOUNTER — HOSPITAL ENCOUNTER (OUTPATIENT)
Dept: RADIOLOGY | Facility: CLINIC | Age: 69
Discharge: HOME | End: 2024-05-28
Payer: MEDICARE

## 2024-05-28 DIAGNOSIS — R93.5 ABNORMAL X-RAY OF ABDOMEN: ICD-10-CM

## 2024-05-28 PROCEDURE — 74177 CT ABD & PELVIS W/CONTRAST: CPT

## 2024-05-28 PROCEDURE — 74177 CT ABD & PELVIS W/CONTRAST: CPT | Performed by: RADIOLOGY

## 2024-05-28 PROCEDURE — 2550000001 HC RX 255 CONTRASTS: Performed by: INTERNAL MEDICINE

## 2024-05-28 RX ADMIN — IOHEXOL 75 ML: 350 INJECTION, SOLUTION INTRAVENOUS at 10:07

## 2024-05-31 ENCOUNTER — HOSPITAL ENCOUNTER (OUTPATIENT)
Dept: RADIOLOGY | Facility: HOSPITAL | Age: 69
Discharge: HOME | End: 2024-05-31
Payer: MEDICARE

## 2024-05-31 DIAGNOSIS — E78.5 HYPERLIPIDEMIA, UNSPECIFIED HYPERLIPIDEMIA TYPE: ICD-10-CM

## 2024-05-31 PROCEDURE — 75571 CT HRT W/O DYE W/CA TEST: CPT

## 2024-07-10 ENCOUNTER — APPOINTMENT (OUTPATIENT)
Dept: GASTROENTEROLOGY | Facility: CLINIC | Age: 69
End: 2024-07-10
Payer: MEDICARE

## 2024-07-16 ENCOUNTER — APPOINTMENT (OUTPATIENT)
Dept: NUTRITION | Facility: CLINIC | Age: 69
End: 2024-07-16
Payer: MEDICARE

## 2024-10-21 ENCOUNTER — TELEPHONE (OUTPATIENT)
Dept: PRIMARY CARE | Facility: CLINIC | Age: 69
End: 2024-10-21
Payer: MEDICARE

## 2024-10-22 ENCOUNTER — TELEMEDICINE (OUTPATIENT)
Dept: PRIMARY CARE | Facility: CLINIC | Age: 69
End: 2024-10-22
Payer: MEDICARE

## 2024-10-22 DIAGNOSIS — J06.9 ACUTE UPPER RESPIRATORY INFECTION: Primary | ICD-10-CM

## 2024-10-22 DIAGNOSIS — J98.01 COUGH DUE TO BRONCHOSPASM: ICD-10-CM

## 2024-10-22 PROCEDURE — 1036F TOBACCO NON-USER: CPT | Performed by: INTERNAL MEDICINE

## 2024-10-22 PROCEDURE — 1159F MED LIST DOCD IN RCRD: CPT | Performed by: INTERNAL MEDICINE

## 2024-10-22 PROCEDURE — 1160F RVW MEDS BY RX/DR IN RCRD: CPT | Performed by: INTERNAL MEDICINE

## 2024-10-22 PROCEDURE — 1157F ADVNC CARE PLAN IN RCRD: CPT | Performed by: INTERNAL MEDICINE

## 2024-10-22 PROCEDURE — 99213 OFFICE O/P EST LOW 20 MIN: CPT | Performed by: INTERNAL MEDICINE

## 2024-10-22 RX ORDER — ALBUTEROL SULFATE 90 UG/1
2 INHALANT RESPIRATORY (INHALATION) EVERY 6 HOURS PRN
Qty: 18 G | Refills: 0 | Status: SHIPPED | OUTPATIENT
Start: 2024-10-22 | End: 2024-11-21

## 2024-10-22 NOTE — PROGRESS NOTES
Subjective   Patient ID: Laure Barton is a 69 y.o. female who presents for URI (Video visit for URI issues).    HPI   The patient presents with 3 day history of flu like symptoms, sinus and nasal congestion, non productive cough, generalized fatigue, body aches and decreased appetite. Denies SOB, fever or chills.  Tested negative for COVID virus.  Assessment/Plan   Diagnoses and all orders for this visit:  Acute upper respiratory infection  Cough due to bronchospasm  -     albuterol (ProAir HFA) 90 mcg/actuation inhaler; Inhale 2 puffs every 6 hours if needed for wheezing or shortness of breath.  Discussed self limiting course of the upper respiratory tract infections caused by viruses. Recommended hydration, rest, self monitoring and symptom relief as needed, PRN follow up.

## 2024-10-28 ENCOUNTER — HOSPITAL ENCOUNTER (OUTPATIENT)
Dept: RADIOLOGY | Facility: HOSPITAL | Age: 69
Discharge: HOME | End: 2024-10-28
Payer: MEDICARE

## 2024-10-28 DIAGNOSIS — Z79.811 AROMATASE INHIBITOR USE: ICD-10-CM

## 2024-10-28 DIAGNOSIS — D05.11 DUCTAL CARCINOMA IN SITU (DCIS) OF RIGHT BREAST: ICD-10-CM

## 2024-10-28 PROCEDURE — 77080 DXA BONE DENSITY AXIAL: CPT

## 2024-10-28 PROCEDURE — 77080 DXA BONE DENSITY AXIAL: CPT | Performed by: RADIOLOGY

## 2024-12-02 ENCOUNTER — HOSPITAL ENCOUNTER (EMERGENCY)
Facility: HOSPITAL | Age: 69
Discharge: HOME | End: 2024-12-02
Attending: EMERGENCY MEDICINE
Payer: MEDICARE

## 2024-12-02 ENCOUNTER — APPOINTMENT (OUTPATIENT)
Dept: CARDIOLOGY | Facility: HOSPITAL | Age: 69
End: 2024-12-02
Payer: MEDICARE

## 2024-12-02 ENCOUNTER — OFFICE VISIT (OUTPATIENT)
Dept: URGENT CARE | Age: 69
End: 2024-12-02
Payer: MEDICARE

## 2024-12-02 ENCOUNTER — APPOINTMENT (OUTPATIENT)
Dept: RADIOLOGY | Facility: HOSPITAL | Age: 69
End: 2024-12-02
Payer: MEDICARE

## 2024-12-02 VITALS
RESPIRATION RATE: 18 BRPM | OXYGEN SATURATION: 97 % | BODY MASS INDEX: 30.86 KG/M2 | HEART RATE: 74 BPM | SYSTOLIC BLOOD PRESSURE: 163 MMHG | TEMPERATURE: 97.5 F | WEIGHT: 174.16 LBS | DIASTOLIC BLOOD PRESSURE: 89 MMHG | HEIGHT: 63 IN

## 2024-12-02 VITALS
TEMPERATURE: 98.1 F | RESPIRATION RATE: 20 BRPM | DIASTOLIC BLOOD PRESSURE: 80 MMHG | HEART RATE: 76 BPM | OXYGEN SATURATION: 97 % | SYSTOLIC BLOOD PRESSURE: 129 MMHG

## 2024-12-02 DIAGNOSIS — R06.02 SHORTNESS OF BREATH: ICD-10-CM

## 2024-12-02 DIAGNOSIS — M79.606 PAIN OF LOWER EXTREMITY, UNSPECIFIED LATERALITY: Primary | ICD-10-CM

## 2024-12-02 DIAGNOSIS — R04.2 HEMOPTYSIS: ICD-10-CM

## 2024-12-02 DIAGNOSIS — R05.1 ACUTE COUGH: Primary | ICD-10-CM

## 2024-12-02 LAB
ALBUMIN SERPL BCP-MCNC: 4.3 G/DL (ref 3.4–5)
ALP SERPL-CCNC: 53 U/L (ref 33–136)
ALT SERPL W P-5'-P-CCNC: 18 U/L (ref 7–45)
ANION GAP SERPL CALCULATED.3IONS-SCNC: 11 MMOL/L (ref 10–20)
AST SERPL W P-5'-P-CCNC: 20 U/L (ref 9–39)
BASOPHILS # BLD AUTO: 0.02 X10*3/UL (ref 0–0.1)
BASOPHILS NFR BLD AUTO: 0.4 %
BILIRUB SERPL-MCNC: 0.5 MG/DL (ref 0–1.2)
BNP SERPL-MCNC: 186 PG/ML (ref 0–99)
BUN SERPL-MCNC: 15 MG/DL (ref 6–23)
CALCIUM SERPL-MCNC: 8.9 MG/DL (ref 8.6–10.3)
CARDIAC TROPONIN I PNL SERPL HS: 12 NG/L (ref 0–13)
CARDIAC TROPONIN I PNL SERPL HS: 14 NG/L (ref 0–13)
CHLORIDE SERPL-SCNC: 104 MMOL/L (ref 98–107)
CO2 SERPL-SCNC: 24 MMOL/L (ref 21–32)
CREAT SERPL-MCNC: 0.76 MG/DL (ref 0.5–1.05)
EGFRCR SERPLBLD CKD-EPI 2021: 86 ML/MIN/1.73M*2
EOSINOPHIL # BLD AUTO: 0.32 X10*3/UL (ref 0–0.7)
EOSINOPHIL NFR BLD AUTO: 5.7 %
ERYTHROCYTE [DISTWIDTH] IN BLOOD BY AUTOMATED COUNT: 12.9 % (ref 11.5–14.5)
GLUCOSE SERPL-MCNC: 100 MG/DL (ref 74–99)
HCT VFR BLD AUTO: 36.7 % (ref 36–46)
HGB BLD-MCNC: 12.5 G/DL (ref 12–16)
IMM GRANULOCYTES # BLD AUTO: 0.01 X10*3/UL (ref 0–0.7)
IMM GRANULOCYTES NFR BLD AUTO: 0.2 % (ref 0–0.9)
LYMPHOCYTES # BLD AUTO: 2.22 X10*3/UL (ref 1.2–4.8)
LYMPHOCYTES NFR BLD AUTO: 39.4 %
MCH RBC QN AUTO: 30.8 PG (ref 26–34)
MCHC RBC AUTO-ENTMCNC: 34.1 G/DL (ref 32–36)
MCV RBC AUTO: 90 FL (ref 80–100)
MONOCYTES # BLD AUTO: 0.49 X10*3/UL (ref 0.1–1)
MONOCYTES NFR BLD AUTO: 8.7 %
NEUTROPHILS # BLD AUTO: 2.58 X10*3/UL (ref 1.2–7.7)
NEUTROPHILS NFR BLD AUTO: 45.6 %
NRBC BLD-RTO: 0 /100 WBCS (ref 0–0)
PLATELET # BLD AUTO: 263 X10*3/UL (ref 150–450)
POTASSIUM SERPL-SCNC: 4.1 MMOL/L (ref 3.5–5.3)
PROT SERPL-MCNC: 7 G/DL (ref 6.4–8.2)
RBC # BLD AUTO: 4.06 X10*6/UL (ref 4–5.2)
SODIUM SERPL-SCNC: 135 MMOL/L (ref 136–145)
WBC # BLD AUTO: 5.6 X10*3/UL (ref 4.4–11.3)

## 2024-12-02 PROCEDURE — 93971 EXTREMITY STUDY: CPT

## 2024-12-02 PROCEDURE — 36415 COLL VENOUS BLD VENIPUNCTURE: CPT | Performed by: PHYSICIAN ASSISTANT

## 2024-12-02 PROCEDURE — 2550000001 HC RX 255 CONTRASTS: Performed by: EMERGENCY MEDICINE

## 2024-12-02 PROCEDURE — 71275 CT ANGIOGRAPHY CHEST: CPT | Performed by: STUDENT IN AN ORGANIZED HEALTH CARE EDUCATION/TRAINING PROGRAM

## 2024-12-02 PROCEDURE — 93005 ELECTROCARDIOGRAM TRACING: CPT

## 2024-12-02 PROCEDURE — 85025 COMPLETE CBC W/AUTO DIFF WBC: CPT | Performed by: PHYSICIAN ASSISTANT

## 2024-12-02 PROCEDURE — 80053 COMPREHEN METABOLIC PANEL: CPT | Performed by: PHYSICIAN ASSISTANT

## 2024-12-02 PROCEDURE — 83880 ASSAY OF NATRIURETIC PEPTIDE: CPT | Performed by: PHYSICIAN ASSISTANT

## 2024-12-02 PROCEDURE — 99285 EMERGENCY DEPT VISIT HI MDM: CPT | Mod: 25 | Performed by: EMERGENCY MEDICINE

## 2024-12-02 PROCEDURE — 71275 CT ANGIOGRAPHY CHEST: CPT

## 2024-12-02 PROCEDURE — 84484 ASSAY OF TROPONIN QUANT: CPT | Performed by: PHYSICIAN ASSISTANT

## 2024-12-02 PROCEDURE — 93971 EXTREMITY STUDY: CPT | Performed by: RADIOLOGY

## 2024-12-02 RX ORDER — ACETAMINOPHEN 325 MG/1
975 TABLET ORAL ONCE
Status: DISCONTINUED | OUTPATIENT
Start: 2024-12-02 | End: 2024-12-02 | Stop reason: HOSPADM

## 2024-12-02 ASSESSMENT — PAIN DESCRIPTION - FREQUENCY: FREQUENCY: CONSTANT/CONTINUOUS

## 2024-12-02 ASSESSMENT — PAIN DESCRIPTION - PROGRESSION: CLINICAL_PROGRESSION: NOT CHANGED

## 2024-12-02 ASSESSMENT — PAIN DESCRIPTION - PAIN TYPE: TYPE: ACUTE PAIN

## 2024-12-02 ASSESSMENT — PAIN - FUNCTIONAL ASSESSMENT: PAIN_FUNCTIONAL_ASSESSMENT: 0-10

## 2024-12-02 ASSESSMENT — PAIN SCALES - GENERAL: PAINLEVEL_OUTOF10: 2

## 2024-12-02 ASSESSMENT — COLUMBIA-SUICIDE SEVERITY RATING SCALE - C-SSRS
2. HAVE YOU ACTUALLY HAD ANY THOUGHTS OF KILLING YOURSELF?: NO
6. HAVE YOU EVER DONE ANYTHING, STARTED TO DO ANYTHING, OR PREPARED TO DO ANYTHING TO END YOUR LIFE?: NO
1. IN THE PAST MONTH, HAVE YOU WISHED YOU WERE DEAD OR WISHED YOU COULD GO TO SLEEP AND NOT WAKE UP?: NO

## 2024-12-02 NOTE — PROGRESS NOTES
Chief Complaint   Patient presents with    Nasal Congestion    THIGH PAIN      PAIN IN LEFT THIGH AND HX OF A CLOT DISORDER, CONGESTION FOR 1 WEEKS, COUGHING UP BLOOD AND GREEN/YELLOW MUCUS        69-year-old female presenting not feeling well this past week and having a cough.  She endorses she did have COVID about a month ago.  She endorses over the past 2 to 3 days her cough has been more productive and she is now coughing up clear phlegm to blood-tinged phlegm.  She also endorses left thigh pain that just started this morning she cannot see with certainty if it is a little more swollen but denies any erythema.  She also endorses she feels slightly more short of breath than normal.    Physical Exam:     Lungs clear bilaterally with no wheezing or rhonchi. Respirations even and unlabored.     HRR with no murmurs or gallops    Unable to visualize left thigh due to pants, however would not change course of treatment she would still be directed to ED      POC Labs:     No visits with results within 2 Day(s) from this visit.   Latest known visit with results is:   Lab on 05/23/2024   Component Date Value Ref Range Status    Creatinine 05/23/2024 0.84  0.50 - 1.05 mg/dL Final    eGFR 05/23/2024 77  >60 mL/min/1.73m*2 Final    Calculations of estimated GFR are performed using the 2021 CKD-EPI Study Refit equation without the race variable for the IDMS-Traceable creatinine methods.  https://jasn.asnjournals.org/content/early/2021/09/22/ASN.5315059934    Urea Nitrogen 05/23/2024 19  6 - 23 mg/dL Final        No diagnosis found.     Medical Decision Making & Plan:       Shortness of breath  Blood-tinged sputum  Left thigh pain  -See HPI for further info and presentation, but essentially this is a 69-year-old female presenting for cough and flulike symptoms, however she is endorsing left thigh pain with blood-tinged sputum.  Did discuss with patient that this fairly well could be a virus or even pneumonia as you can have  blood-tinged sputum even with these conditions, however cannot rule out PE.  Given she is also having left thigh pain she may also have DVT.  she does have risk factors for DVT/PE such as reported clotting disorder, history of malignancy, and recent COVID.  Discussed with patient that given the symptoms and risk factors she needs to go to the ED to rule out PE and DVT to which she verbalized understanding and [place]        NP discussed plan and lab/testing results with Dr. Hastings          12/02/24 at 4:04 PM - OSMEL Beth-CNP

## 2024-12-02 NOTE — PROGRESS NOTES
Attestation/Supervisory note for MAXWELL Smith      The patient is a 69-year-old female presenting to the emergency department for evaluation of shortness of breath, dyspnea on exertion, and left lower leg pain.  She does have a history of factor V Leiden.  She does not use any blood thinners.  No history of PE or DVT.  No history of CAD or ACS.  No recent injury or trauma.  No recent immobility or recent surgery.  She does not smoke or drink.  No headache or visual changes.  No chest pain.  No palpitations.  No diaphoresis.  No fever or chills.  No cough or congestion.  No abdominal pain.  No nausea vomiting.  No diarrhea or constipation.  no urinary complaints.  All pertinent positives and negatives are recorded above.  All other systems reviewed and otherwise negative.  Vital signs with hypertension but otherwise within normal limits.  Physical exam with a well-nourished well-developed female in no acute distress.  HEENT exam within normal limits.  She has no evidence of airway compromise or respiratory distress.  Abdominal exam is benign.  She does not have any gross motor, neurologic or vascular deficits on exam.  Pulses are equal bilaterally.      EKG with normal sinus rhythm at 71 bpm, leftward axis, left bundle branch block pattern, normal ST segment, normal T waves      Oral acetaminophen ordered      Diagnostic labs without significant abnormality      Initial troponin 14.  Repeat troponin 12      CT angio chest for pulmonary embolism   Final Result   1.  No evidence of pulmonary emboli to the distal segmental level. No   evidence of acute chest pathology.   2. Sternotomy changes and left ventricular apex thinning suggestive   of prior myocardial infarct.   3. Diffuse hepatic steatosis.             Signed by: Porter Sanchez 12/2/2024 6:13 PM   Dictation workstation:   [id number]      Lower extremity venous duplex left   Final Result   No deep venous thrombosis of the  left lower extremity.        MACRO:    None        Signed by: Mariselasaji Hogueholly 12/2/2024 5:40 PM   Dictation workstation:   [id number]           The patient does not have any evidence of airway compromise or respiratory distress on exam.  She does not have any evidence of ischemia on EKG or cardiac enzymes.  No events on telemetry.  She does not have any evidence of significant lab abnormality.  She is well-perfused on exam.  Duplex of the left lower extremity shows no evidence of DVT.  The CT angio chest shows no evidence of PE or dissection.  No evidence of pneumonia or pneumothorax.  No evidence of CHF.      The patient was released in good condition.  She was instructed to follow-up with her primary care physician within 1 to 2 days for further management of her current symptoms and repeat check of her blood pressure..  She will return to the emergency department sooner with worsening of symptoms or onset of new symptoms.      Impression/diagnosis:  Dyspnea, dyspnea on exertion  Left lower extremity pain  Hypertension, unspecified      I personally saw the patient and made/approve the management plan and take responsibility for the patient management.      I independently interpreted the following study (S) EKG and diagnostic labs      I personally discussed the patient's management with the patient      I reviewed the results of the diagnostic labs and diagnostic imaging.  Formal radiology read was completed by the radiologist.      Irasema Sahu MD

## 2024-12-02 NOTE — ED PROVIDER NOTES
HPI   Chief Complaint   Patient presents with   • Leg Pain     Right leg pain and clotting history       69-year-old female presented emergency department the chief complaint of pain in her left thigh region.  She also complains of viral-like some trauma over the last 4 weeks.  States that she had some blood-tinged sputum today while coughing.  Went to urgent care and then presented to emergency department.  Denies lightheadedness dizziness numbness weakness.  Well-appearing nontoxic afebrile.  No other complaint.              Patient History   Past Medical History:   Diagnosis Date   • Breast cancer (Multi)    • Palpitations     Palpitations   • Personal history of irradiation    • Personal history of other diseases of the digestive system     History of esophageal reflux   • Personal history of other endocrine, nutritional and metabolic disease     History of hyperlipidemia   • Personal history of other specified conditions 2020    History of headache     Past Surgical History:   Procedure Laterality Date   • BREAST BIOPSY Right    • OTHER SURGICAL HISTORY  2022     section   • OTHER SURGICAL HISTORY  2022    Septal myectomy     Family History   Problem Relation Name Age of Onset   • Dementia Mother     • Glaucoma Mother     • Breast cancer Mother     • Pancreatic cancer Mother     • Hypertension Mother     • Diabetes Father     • Osteoarthritis Father     • Sudden death Father     • Dementia Sister     • Breast cancer Sister  50   • Sleep apnea Sister     • Sudden death Sister     • Diabetes Brother     • Uterine cancer Paternal Grandmother     • Coronary artery disease Other       Social History     Tobacco Use   • Smoking status: Never   • Smokeless tobacco: Never   Vaping Use   • Vaping status: Never Used   Substance Use Topics   • Alcohol use: Defer   • Drug use: Never       Physical Exam   ED Triage Vitals [24 1614]   Temperature Heart Rate Respirations BP   36.4 °C (97.5 °F)  74 18 163/89      Pulse Ox Temp Source Heart Rate Source Patient Position   97 % Temporal Monitor Sitting      BP Location FiO2 (%)     Right arm --       Physical Exam  Vitals and nursing note reviewed.   Constitutional:       Appearance: Normal appearance.   HENT:      Head: Normocephalic.      Nose: Nose normal.      Mouth/Throat:      Mouth: Mucous membranes are moist.   Cardiovascular:      Rate and Rhythm: Normal rate and regular rhythm.      Pulses: Normal pulses.   Pulmonary:      Effort: Pulmonary effort is normal.   Abdominal:      General: Abdomen is flat.   Musculoskeletal:         General: Normal range of motion.      Cervical back: Normal range of motion.   Skin:     General: Skin is warm.   Neurological:      General: No focal deficit present.      Mental Status: She is alert and oriented to person, place, and time.   Psychiatric:         Mood and Affect: Mood normal.           ED Course & MDM   Diagnoses as of 12/02/24 1820   Pain of lower extremity, unspecified laterality   Shortness of breath   Hemoptysis                 No data recorded     Yelena Coma Scale Score: 15 (12/02/24 1615 : Emelia Osborne, EMT)                           Medical Decision Making  I have seen and evaluated this patient.  The attending physician has also seen and evaluated this patient.  Vital signs, laboratory testing and diagnostic images if applicable have been reviewed.  All laboratory and imaging is interpreted by myself unless otherwise stated.  Radiology studies are also formally interpreted by radiologist.    CBC without significant leukocytosis or anemia, metabolic panel without significant renal impairment or electrolyte abnormality.  Troponin within an appropriate range without significant delta change, chest x-ray without actionable cardiopulmonary process, EKG without evidence of acute ischemia.  CT angio negative for pulmonary embolism, ultrasound negative for DVT thrombosis.  Patient reports that she walked  up a large amount of stairs yesterday.  Suspect her leg pain is secondary to this.  Released in stable condition from emergent standpoint.          Labs Reviewed   COMPREHENSIVE METABOLIC PANEL - Abnormal       Result Value    Glucose 100 (*)     Sodium 135 (*)     Potassium 4.1      Chloride 104      Bicarbonate 24      Anion Gap 11      Urea Nitrogen 15      Creatinine 0.76      eGFR 86      Calcium 8.9      Albumin 4.3      Alkaline Phosphatase 53      Total Protein 7.0      AST 20      Bilirubin, Total 0.5      ALT 18     B-TYPE NATRIURETIC PEPTIDE - Abnormal     (*)     Narrative:        <100 pg/mL - Heart failure unlikely  100-299 pg/mL - Intermediate probability of acute heart                  failure exacerbation. Correlate with clinical                  context and patient history.    >=300 pg/mL - Heart Failure likely. Correlate with clinical                  context and patient history.    BNP testing is performed using different testing methodology at [facility] than at other [facility]. Direct result comparisons should only be made within the same method.      SERIAL TROPONIN-INITIAL - Abnormal    Troponin I, High Sensitivity 14 (*)     Narrative:     Less than 99th percentile of normal range cutoff-  Female and children under 18 years old <14 ng/L; Male <21 ng/L: Negative  Repeat testing should be performed if clinically indicated.     Female and children under 18 years old 14-50 ng/L; Male 21-50 ng/L:  Consistent with possible cardiac damage and possible increased clinical   risk. Serial measurements may help to assess extent of myocardial damage.     >50 ng/L: Consistent with cardiac damage, increased clinical risk and  myocardial infarction. Serial measurements may help assess extent of   myocardial damage.      NOTE: Children less than 1 year old may have higher baseline troponin   levels and results should be interpreted in conjunction with the overall   clinical  context.     NOTE: Troponin I testing is performed using a different   testing methodology at [facility] than at other   [facility]. Direct result comparisons should only   be made within the same method.   SERIAL TROPONIN, 1 HOUR - Normal    Troponin I, High Sensitivity 12      Narrative:     Less than 99th percentile of normal range cutoff-  Female and children under 18 years old <14 ng/L; Male <21 ng/L: Negative  Repeat testing should be performed if clinically indicated.     Female and children under 18 years old 14-50 ng/L; Male 21-50 ng/L:  Consistent with possible cardiac damage and possible increased clinical   risk. Serial measurements may help to assess extent of myocardial damage.     >50 ng/L: Consistent with cardiac damage, increased clinical risk and  myocardial infarction. Serial measurements may help assess extent of   myocardial damage.      NOTE: Children less than 1 year old may have higher baseline troponin   levels and results should be interpreted in conjunction with the overall   clinical context.     NOTE: Troponin I testing is performed using a different   testing methodology at [facility] than at other   [facility]. Direct result comparisons should only   be made within the same method.   CBC WITH AUTO DIFFERENTIAL    WBC 5.6      nRBC 0.0      RBC 4.06      Hemoglobin 12.5      Hematocrit 36.7      MCV 90      MCH 30.8      MCHC 34.1      RDW 12.9      Platelets 263      Neutrophils % 45.6      Immature Granulocytes %, Automated 0.2      Lymphocytes % 39.4      Monocytes % 8.7      Eosinophils % 5.7      Basophils % 0.4      Neutrophils Absolute 2.58      Immature Granulocytes Absolute, Automated 0.01      Lymphocytes Absolute 2.22      Monocytes Absolute 0.49      Eosinophils Absolute 0.32      Basophils Absolute 0.02     TROPONIN SERIES- (INITIAL, 1 HR)    Narrative:     The following orders were created for panel order Troponin Series, (0, 1  HR).  Procedure                               Abnormality         Status                     ---------                               -----------         ------                     Troponin I, High Sensiti...[[id number]]  Abnormal            Final result               Troponin, High Sensitivi...[[id number]]  Normal              Final result                 Please view results for these tests on the individual orders.     CT angio chest for pulmonary embolism   Final Result   1.  No evidence of pulmonary emboli to the distal segmental level. No   evidence of acute chest pathology.   2. Sternotomy changes and left ventricular apex thinning suggestive   of prior myocardial infarct.   3. Diffuse hepatic steatosis.             Signed by: Porter Sanchez 12/2/2024 6:13 PM   Dictation workstation:   [id number]      Lower extremity venous duplex left   Final Result   No deep venous thrombosis of the  left lower extremity.        MACRO:   None        Signed by: Landon Barnhart 12/2/2024 5:40 PM   Dictation workstation:   [id number]        Medications   acetaminophen (Tylenol) tablet 975 mg (975 mg oral Not Given 12/2/24 1633)   iohexol (OMNIPaque) 350 mg iodine/mL solution 75 mL (75 mL intravenous Given 12/2/24 1729)     New Prescriptions    No medications on file         Procedure  Procedures     Champ Smith PA-C  12/02/24 7153

## 2024-12-11 ENCOUNTER — TELEPHONE (OUTPATIENT)
Dept: CARDIAC SURGERY | Facility: HOSPITAL | Age: 69
End: 2024-12-11
Payer: MEDICARE

## 2025-01-01 ENCOUNTER — APPOINTMENT (OUTPATIENT)
Dept: RADIOLOGY | Facility: HOSPITAL | Age: 70
End: 2025-01-01
Payer: MEDICARE

## 2025-01-03 ENCOUNTER — HOSPITAL ENCOUNTER (OUTPATIENT)
Dept: RADIOLOGY | Facility: CLINIC | Age: 70
Discharge: HOME | End: 2025-01-03
Payer: MEDICARE

## 2025-01-03 ENCOUNTER — APPOINTMENT (OUTPATIENT)
Dept: SURGICAL ONCOLOGY | Facility: CLINIC | Age: 70
End: 2025-01-03
Payer: MEDICARE

## 2025-01-03 VITALS — WEIGHT: 174.16 LBS | BODY MASS INDEX: 30.86 KG/M2 | HEIGHT: 63 IN

## 2025-01-03 DIAGNOSIS — Z12.31 ENCOUNTER FOR SCREENING MAMMOGRAM FOR HIGH-RISK PATIENT: ICD-10-CM

## 2025-01-03 PROCEDURE — 77067 SCR MAMMO BI INCL CAD: CPT

## 2025-01-10 ENCOUNTER — OFFICE VISIT (OUTPATIENT)
Dept: URGENT CARE | Age: 70
End: 2025-01-10
Payer: MEDICARE

## 2025-01-10 VITALS
SYSTOLIC BLOOD PRESSURE: 117 MMHG | RESPIRATION RATE: 16 BRPM | TEMPERATURE: 97.2 F | WEIGHT: 170 LBS | OXYGEN SATURATION: 95 % | DIASTOLIC BLOOD PRESSURE: 74 MMHG | HEART RATE: 74 BPM | BODY MASS INDEX: 30.12 KG/M2

## 2025-01-10 DIAGNOSIS — R05.9 COUGH, UNSPECIFIED TYPE: Primary | ICD-10-CM

## 2025-01-10 DIAGNOSIS — J21.0 RSV (ACUTE BRONCHIOLITIS DUE TO RESPIRATORY SYNCYTIAL VIRUS): ICD-10-CM

## 2025-01-10 LAB
POC RAPID INFLUENZA A: NEGATIVE
POC RAPID INFLUENZA B: NEGATIVE
POC RSV PCR: DETECTED
POC SARS-COV-2 AG BINAX: NORMAL

## 2025-01-10 ASSESSMENT — ENCOUNTER SYMPTOMS
HEADACHES: 1
COUGH: 1

## 2025-01-10 NOTE — PROGRESS NOTES
Subjective   Patient ID: Laure Barton is a 69 y.o. female. They present today with a chief complaint of Cough, no appetite, and Headache (For 4 days).    History of Present Illness    Cough  Associated symptoms include headaches.   Headache  Associated symptoms: cough        Past Medical History  Allergies as of 01/10/2025 - Reviewed 01/10/2025   Allergen Reaction Noted    Sulfa (sulfonamide antibiotics) Rash 1991       (Not in a hospital admission)       Past Medical History:   Diagnosis Date    Breast cancer (Multi)     Palpitations     Palpitations    Personal history of irradiation     Personal history of other diseases of the digestive system     History of esophageal reflux    Personal history of other endocrine, nutritional and metabolic disease     History of hyperlipidemia    Personal history of other specified conditions 2020    History of headache       Past Surgical History:   Procedure Laterality Date    BREAST BIOPSY Right     OTHER SURGICAL HISTORY  2022     section    OTHER SURGICAL HISTORY  2022    Septal myectomy        reports that she has never smoked. She has never used smokeless tobacco. Alcohol use questions deferred to the physician. She reports that she does not use drugs.    Review of Systems  Review of Systems   Respiratory:  Positive for cough.    Neurological:  Positive for headaches.                                  Objective    Vitals:    01/10/25 1309   BP: 117/74   BP Location: Left arm   Patient Position: Sitting   BP Cuff Size: Adult   Pulse: 74   Resp: 16   Temp: 36.2 °C (97.2 °F)   TempSrc: Oral   SpO2: 95%   Weight: 77.1 kg (170 lb)     No LMP recorded. Patient is postmenopausal.    Physical Exam  Vitals reviewed.   Constitutional:       Appearance: Normal appearance.   HENT:      Head: Normocephalic and atraumatic.      Right Ear: Tympanic membrane, ear canal and external ear normal.      Left Ear: Tympanic membrane, ear canal and external ear  normal.      Nose: Congestion present.      Mouth/Throat:      Mouth: Mucous membranes are moist.      Pharynx: Oropharynx is clear.   Eyes:      Conjunctiva/sclera: Conjunctivae normal.      Pupils: Pupils are equal, round, and reactive to light.   Cardiovascular:      Rate and Rhythm: Normal rate and regular rhythm.      Pulses: Normal pulses.      Heart sounds: Normal heart sounds.   Pulmonary:      Effort: Pulmonary effort is normal.      Breath sounds: Normal breath sounds.   Musculoskeletal:         General: Normal range of motion.   Skin:     General: Skin is warm.      Capillary Refill: Capillary refill takes less than 2 seconds.   Neurological:      General: No focal deficit present.      Mental Status: She is alert and oriented to person, place, and time.   Psychiatric:         Mood and Affect: Mood normal.         Behavior: Behavior normal.         Procedures    Point of Care Test & Imaging Results from this visit  Results for orders placed or performed in visit on 01/10/25   POCT Covid-19 Rapid Antigen   Result Value Ref Range    POC MIGUEL-COV-2 AG  Presumptive negative test for SARS-CoV-2 (no antigen detected)     Presumptive negative test for SARS-CoV-2 (no antigen detected)   POCT Influenza A/B manually resulted   Result Value Ref Range    POC Rapid Influenza A Negative Negative    POC Rapid Influenza B Negative Negative   POCT RSV PCR manually resulted   Result Value Ref Range    POC RSV PCR Detected (A) Not Detected      No results found.    Diagnostic study results (if any) were reviewed by [facility].    Assessment/Plan   Allergies, medications, history, and pertinent labs/EKGs/Imaging reviewed by OSMEL Mills-CNP.     Medical Decision Making    Patient is 69-year-old female presents for congestion cough mild headache.  Patient reports decrease in appetite.  Patient is drinking and urinating normally.  Patient is worried she has RSV.  Patient is in no acute  distress vital signs are stable heart rate is regular lungs are clear bilaterally patient is breathing normal she denies any chest pain dizziness shortness of breath.  COVID and flu are negative.  RSV is positive.  Patient is to rest at home if she develops any worsening symptoms she is to go to the emergency department patient reports she is breathing normally and declines any steroids at this point.  I do not deem them necessary also patient has no wheezing patient is to increase her fluids rest take Tylenol or ibuprofen as needed if she develops any shortness of breath or chest pain or high fever patient is to go to the emergency department patient agrees with plan of care follow-up with your primary care doctor in 2 days patient agrees with plan of care left in stable condition.  Orders and Diagnoses  Diagnoses and all orders for this visit:  Cough, unspecified type  -     POCT Covid-19 Rapid Antigen  -     POCT Influenza A/B manually resulted  -     POCT RSV PCR manually resulted      Medical Admin Record      Patient disposition: Home    Electronically signed by [facility]  1:24 PM

## 2025-01-10 NOTE — PATIENT INSTRUCTIONS
Patient to rest, take ibuprofen or tylenol as needed ,go to emergency department with any worsening symptoms.

## 2025-01-13 ENCOUNTER — APPOINTMENT (OUTPATIENT)
Dept: PRIMARY CARE | Facility: CLINIC | Age: 70
End: 2025-01-13
Payer: MEDICARE

## 2025-01-13 DIAGNOSIS — J98.01 COUGH DUE TO BRONCHOSPASM: Primary | ICD-10-CM

## 2025-01-13 PROCEDURE — 1160F RVW MEDS BY RX/DR IN RCRD: CPT | Performed by: INTERNAL MEDICINE

## 2025-01-13 PROCEDURE — 1157F ADVNC CARE PLAN IN RCRD: CPT | Performed by: INTERNAL MEDICINE

## 2025-01-13 PROCEDURE — 1159F MED LIST DOCD IN RCRD: CPT | Performed by: INTERNAL MEDICINE

## 2025-01-13 PROCEDURE — 1036F TOBACCO NON-USER: CPT | Performed by: INTERNAL MEDICINE

## 2025-01-13 PROCEDURE — 99213 OFFICE O/P EST LOW 20 MIN: CPT | Performed by: INTERNAL MEDICINE

## 2025-01-13 NOTE — PROGRESS NOTES
Subjective   Patient ID: Laure Barton is a 69 y.o. female who presents for Cough (Video visit for cough ).  The patient is in the state of [place] during the virtual visit.    HPI   The patient presents with 6 day history of RSV infection. C/O non productive cough, reports an overall improvement. Denies shortness of breath, fever or chills.         Assessment/Plan   Diagnoses and all orders for this visit:  Cough due to bronchospasm  Discussed self limiting course of the viral respiratory tract infections. Recommended hydration, rest, symptom relief as needed, Albuterol inhaler 2 puffs qid PRN for bronchodilatation, PRN follow up.

## 2025-01-15 PROBLEM — C50.919 MALIGNANT NEOPLASM OF FEMALE BREAST, UNSPECIFIED ESTROGEN RECEPTOR STATUS, UNSPECIFIED LATERALITY, UNSPECIFIED SITE OF BREAST: Status: RESOLVED | Noted: 2023-05-31 | Resolved: 2025-01-15

## 2025-01-15 NOTE — PROGRESS NOTES
BREAST SURGICAL ONCOLOGY FOLLOW UP     Subjective   Laure Barton is a 69 y.o. female presents today for follow up ductal carcinoma in situ of the right breast.     Treatment history:  DCIS of the right breast diagnosed in 2022.  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  Never took birth control     FAMILY HISTORY: Sister with breast cancer diagnosed in her 50s  at 69 with dementia  Paternal grandmother with ovarian cancer diagnosed in her 40s ( long ago, the patient never knew her grandmother)   Mother with possible pancreatic cancer in her 80's        Mammogram: 1/3/2025-bilateral mammogram shows posttreatment changes.  No evidence of malignancy.  Category 2.    Radiology review: All images and reports were personally reviewed and the findings discussed with the patient.     Review of Systems   Constitutional symptoms: Denies generalized fatigue.  Denies weight change, fevers/chills, difficulty sleeping   Eyes: Denies double vision, glaucoma, cataracts.  Ear/nose/throat/mouth: Denies hearing changes, sore throat, sinus problems.  Cardiovascular: No chest pain.  Denies irregular heartbeat.  Denies ankle swelling.  Respiratory: No wheezing, cough,  or shortness of breath.  Gastrointestinal: No abdominal pain,  No nausea/vomiting.  No indigestion/heartburn.  No change in bowel habits.  No constipation or diarrhea.   Genitourinary: No urinary incontinence.  No urinary frequency.  No painful urination.  Musculoskeletal: No bone pain, no muscle pain, no joint pain.   Integumentary: No rash. No masses.  No changes in moles.  No easy bruising.  Neurological: No headaches.  No tremors. No numbness/tingling.  Psychiatric: No anxiety. No depression.  Endocrine: No excessive thirst.  Not too hot or too cold.  Not tired or fatigued.    Hematological/lymphatic: No swollen glands or blood clotting problems.  No bruising.    PHYSICAL EXAM:    General: Alert and oriented x 3.  Mood and affect are appropriate.  Neck: supple, no masses, no cervical adenopathy.  Cardiovascular: no lower extremity edema.  Pulmonary: breathing non labored on room air.  GI: Abdomen soft, no masses. No hepatomegaly or splenomegaly.  Lymph nodes: No supraclavicular or axillary adenopathy bilaterally.  Musculoskeletal: Full range of motion in the upper extremities bilaterally.  Neuro: denies dizziness, tremors  Physical Exam  Chest:      Comments: Lymph node exam shows no cervical, supraclavicular, or axillary lymphadenopathy.   Breast exam shows symmetric breasts bilaterally with no skin changes, no dominant masses and no nipple discharge in either breast.           Assessment/Plan   DCIS of the right breast diagnosed in March 2022.  ER/DC positive  Doing well.  Stable exam and bilateral mammogram.  Continue anastrozole.      Clinical breast exam and mammogram today are normal.  There is no evidence of cancer recurrence.    Will follow up with me if there are any breast concerns.   Andressa Bee MD

## 2025-01-21 ENCOUNTER — OFFICE VISIT (OUTPATIENT)
Dept: SURGICAL ONCOLOGY | Facility: CLINIC | Age: 70
End: 2025-01-21
Payer: MEDICARE

## 2025-01-21 VITALS
BODY MASS INDEX: 30.49 KG/M2 | HEART RATE: 71 BPM | WEIGHT: 172.1 LBS | HEIGHT: 63 IN | SYSTOLIC BLOOD PRESSURE: 119 MMHG | TEMPERATURE: 97.5 F | DIASTOLIC BLOOD PRESSURE: 75 MMHG | RESPIRATION RATE: 17 BRPM

## 2025-01-21 DIAGNOSIS — D05.11 DUCTAL CARCINOMA IN SITU (DCIS) OF RIGHT BREAST: Primary | ICD-10-CM

## 2025-01-21 PROCEDURE — 1125F AMNT PAIN NOTED PAIN PRSNT: CPT | Performed by: SURGERY

## 2025-01-21 PROCEDURE — 3008F BODY MASS INDEX DOCD: CPT | Performed by: SURGERY

## 2025-01-21 PROCEDURE — 99213 OFFICE O/P EST LOW 20 MIN: CPT | Performed by: SURGERY

## 2025-01-21 PROCEDURE — 1159F MED LIST DOCD IN RCRD: CPT | Performed by: SURGERY

## 2025-01-21 PROCEDURE — 1157F ADVNC CARE PLAN IN RCRD: CPT | Performed by: SURGERY

## 2025-01-21 PROCEDURE — 3074F SYST BP LT 130 MM HG: CPT | Performed by: SURGERY

## 2025-01-21 PROCEDURE — 1160F RVW MEDS BY RX/DR IN RCRD: CPT | Performed by: SURGERY

## 2025-01-21 PROCEDURE — 3078F DIAST BP <80 MM HG: CPT | Performed by: SURGERY

## 2025-01-21 ASSESSMENT — PAIN SCALES - GENERAL: PAINLEVEL_OUTOF10: 5

## 2025-01-21 NOTE — PATIENT INSTRUCTIONS
You no longer need to see a breast surgeon. Please follow-up with Yancy & continue yearly mammograms.

## 2025-02-06 ENCOUNTER — APPOINTMENT (OUTPATIENT)
Dept: DERMATOLOGY | Facility: CLINIC | Age: 70
End: 2025-02-06
Payer: MEDICARE

## 2025-02-06 DIAGNOSIS — L82.1 SEBORRHEIC KERATOSIS: Primary | ICD-10-CM

## 2025-02-06 PROCEDURE — 1157F ADVNC CARE PLAN IN RCRD: CPT | Performed by: STUDENT IN AN ORGANIZED HEALTH CARE EDUCATION/TRAINING PROGRAM

## 2025-02-06 PROCEDURE — 1159F MED LIST DOCD IN RCRD: CPT | Performed by: STUDENT IN AN ORGANIZED HEALTH CARE EDUCATION/TRAINING PROGRAM

## 2025-02-06 PROCEDURE — 99212 OFFICE O/P EST SF 10 MIN: CPT | Performed by: STUDENT IN AN ORGANIZED HEALTH CARE EDUCATION/TRAINING PROGRAM

## 2025-02-06 ASSESSMENT — DERMATOLOGY QUALITY OF LIFE (QOL) ASSESSMENT
RATE HOW BOTHERED YOU ARE BY EFFECTS OF YOUR SKIN PROBLEMS ON YOUR ACTIVITIES (EG, GOING OUT, ACCOMPLISHING WHAT YOU WANT, WORK ACTIVITIES OR YOUR RELATIONSHIPS WITH OTHERS): 0 - NEVER BOTHERED
RATE HOW BOTHERED YOU ARE BY SYMPTOMS OF YOUR SKIN PROBLEM (EG, ITCHING, STINGING BURNING, HURTING OR SKIN IRRITATION): 0 - NEVER BOTHERED
ARE THERE EXCLUSIONS OR EXCEPTIONS FOR THE QUALITY OF LIFE ASSESSMENT: NO
RATE HOW EMOTIONALLY BOTHERED YOU ARE BY YOUR SKIN PROBLEM (FOR EXAMPLE, WORRY, EMBARRASSMENT, FRUSTRATION): 0 - NEVER BOTHERED
DATE THE QUALITY-OF-LIFE ASSESSMENT WAS COMPLETED: 67242

## 2025-02-06 ASSESSMENT — ITCH NUMERIC RATING SCALE: HOW SEVERE IS YOUR ITCHING?: 0

## 2025-02-06 ASSESSMENT — DERMATOLOGY PATIENT ASSESSMENT: DO YOU HAVE ANY NEW OR CHANGING LESIONS: YES

## 2025-02-06 ASSESSMENT — PATIENT GLOBAL ASSESSMENT (PGA): PATIENT GLOBAL ASSESSMENT: PATIENT GLOBAL ASSESSMENT:  1 - CLEAR

## 2025-02-06 NOTE — PROGRESS NOTES
Subjective     Laure Barton is a 69 y.o. female who presents for the following: Suspicious Skin Lesion (Left Lower Back - approx 3 mos ago, denies itching, pain, rough texture, not healing.).     Review of Systems:  No other skin or systemic complaints other than what is documented elsewhere in the note.    The following portions of the chart were reviewed this encounter and updated as appropriate:          Skin Cancer History  No skin cancer on file.      Specialty Problems          Dermatology Problems    Hemangioma of skin and subcutaneous tissue    Hypertrophic scar    Keloid    Melanocytic nevi of trunk    Other hypertrophic disorders of the skin    Other melanin hyperpigmentation    Other seborrheic keratosis    Contusion of rib        Objective   Well appearing patient in no apparent distress; mood and affect are within normal limits.    A focused skin examination was performed. All findings within normal limits unless otherwise noted below.    Assessment/Plan   1. Seborrheic keratosis (2)  Generalized, Left Buttock  Seborrheic papule    Benign appearing seborrheic papule  Reassured, benign

## 2025-03-13 ENCOUNTER — APPOINTMENT (OUTPATIENT)
Dept: PRIMARY CARE | Facility: CLINIC | Age: 70
End: 2025-03-13
Payer: MEDICARE

## 2025-03-13 VITALS
WEIGHT: 175 LBS | HEIGHT: 62 IN | HEART RATE: 69 BPM | BODY MASS INDEX: 32.2 KG/M2 | DIASTOLIC BLOOD PRESSURE: 80 MMHG | SYSTOLIC BLOOD PRESSURE: 150 MMHG | OXYGEN SATURATION: 97 %

## 2025-03-13 DIAGNOSIS — R73.01 IFG (IMPAIRED FASTING GLUCOSE): ICD-10-CM

## 2025-03-13 DIAGNOSIS — J98.01 COUGH DUE TO BRONCHOSPASM: ICD-10-CM

## 2025-03-13 DIAGNOSIS — E03.9 HYPOTHYROIDISM, UNSPECIFIED TYPE: ICD-10-CM

## 2025-03-13 DIAGNOSIS — K59.00 CONSTIPATION, UNSPECIFIED CONSTIPATION TYPE: ICD-10-CM

## 2025-03-13 DIAGNOSIS — Z00.00 MEDICARE ANNUAL WELLNESS VISIT, SUBSEQUENT: Primary | ICD-10-CM

## 2025-03-13 DIAGNOSIS — Z00.00 ROUTINE GENERAL MEDICAL EXAMINATION AT HEALTH CARE FACILITY: ICD-10-CM

## 2025-03-13 DIAGNOSIS — I10 HYPERTENSION, UNSPECIFIED TYPE: ICD-10-CM

## 2025-03-13 DIAGNOSIS — E78.1 HYPERTRIGLYCERIDEMIA: ICD-10-CM

## 2025-03-13 DIAGNOSIS — F41.8 DEPRESSION WITH ANXIETY: ICD-10-CM

## 2025-03-13 DIAGNOSIS — I42.1 OBSTRUCTIVE HYPERTROPHIC CARDIOMYOPATHY (MULTI): ICD-10-CM

## 2025-03-13 DIAGNOSIS — E78.5 HYPERLIPIDEMIA, UNSPECIFIED HYPERLIPIDEMIA TYPE: ICD-10-CM

## 2025-03-13 DIAGNOSIS — Z23 IMMUNIZATION DUE: ICD-10-CM

## 2025-03-13 RX ORDER — LOSARTAN POTASSIUM 50 MG/1
50 TABLET ORAL DAILY
Qty: 90 TABLET | Refills: 1 | Status: SHIPPED | OUTPATIENT
Start: 2025-03-13

## 2025-03-13 RX ORDER — LEVOTHYROXINE SODIUM 50 UG/1
50 TABLET ORAL
Qty: 90 TABLET | Refills: 3 | Status: SHIPPED | OUTPATIENT
Start: 2025-03-13

## 2025-03-13 RX ORDER — ROSUVASTATIN CALCIUM 10 MG/1
10 TABLET, COATED ORAL NIGHTLY
Qty: 90 TABLET | Refills: 3 | Status: SHIPPED | OUTPATIENT
Start: 2025-03-13

## 2025-03-13 RX ORDER — VENLAFAXINE HYDROCHLORIDE 37.5 MG/1
37.5 CAPSULE, EXTENDED RELEASE ORAL DAILY
Qty: 90 CAPSULE | Refills: 3 | Status: SHIPPED | OUTPATIENT
Start: 2025-03-13

## 2025-03-13 RX ORDER — LOSARTAN POTASSIUM 25 MG/1
25 TABLET ORAL DAILY
Qty: 30 TABLET | Refills: 11 | Status: CANCELLED | OUTPATIENT
Start: 2025-03-13 | End: 2026-03-13

## 2025-03-13 RX ORDER — ALBUTEROL SULFATE 90 UG/1
2 INHALANT RESPIRATORY (INHALATION) EVERY 6 HOURS PRN
Qty: 18 G | Refills: 0 | Status: SHIPPED | OUTPATIENT
Start: 2025-03-13 | End: 2025-04-12

## 2025-03-13 RX ORDER — PREDNISONE 10 MG/1
TABLET ORAL
Qty: 30 TABLET | Refills: 0 | Status: SHIPPED | OUTPATIENT
Start: 2025-03-13

## 2025-03-13 RX ORDER — FENOFIBRATE 160 MG/1
160 TABLET ORAL DAILY
Qty: 90 TABLET | Refills: 3 | Status: SHIPPED | OUTPATIENT
Start: 2025-03-13

## 2025-03-13 ASSESSMENT — ACTIVITIES OF DAILY LIVING (ADL)
BATHING: INDEPENDENT
DRESSING: INDEPENDENT
GROCERY_SHOPPING: INDEPENDENT
DOING_HOUSEWORK: INDEPENDENT
TAKING_MEDICATION: INDEPENDENT
MANAGING_FINANCES: INDEPENDENT

## 2025-03-13 ASSESSMENT — PATIENT HEALTH QUESTIONNAIRE - PHQ9
SUM OF ALL RESPONSES TO PHQ9 QUESTIONS 1 AND 2: 0
1. LITTLE INTEREST OR PLEASURE IN DOING THINGS: NOT AT ALL
2. FEELING DOWN, DEPRESSED OR HOPELESS: NOT AT ALL

## 2025-03-13 NOTE — ASSESSMENT & PLAN NOTE
Orders:    Albumin-Creatinine Ratio, Urine Random; Future    losartan (Cozaar) 50 mg tablet; Take 1 tablet (50 mg) by mouth once daily.  Discussed uncontrolled HTN, BP goals, cardiovascular complications due to uncontrolled state. Recommended to increase the Losartan to 50 mg daily, in addition to a healthy life style, DASH eating diet. Home BP monitor validation in the office, if available. Close office BP monitoring in 12 weeks.

## 2025-03-13 NOTE — ASSESSMENT & PLAN NOTE
Orders:    rosuvastatin (Crestor) 10 mg tablet; Take 1 tablet (10 mg) by mouth once daily at bedtime.    Lipid panel; Future

## 2025-03-13 NOTE — ASSESSMENT & PLAN NOTE
Orders:    levothyroxine (Synthroid, Levoxyl) 50 mcg tablet; Take 1 tablet (50 mcg) by mouth once daily in the morning. Take before meals.    TSH; Future

## 2025-03-13 NOTE — PROGRESS NOTES
"Subjective   Reason for Visit: Laure Barton is an 69 y.o. female here for a Medicare Wellness visit.     Past Medical, Surgical, and Family History reviewed and updated in chart.    Reviewed all medications by prescribing practitioner or clinical pharmacist (such as prescriptions, OTCs, herbal therapies and supplements) and documented in the medical record.    HPI  The patient presents for an annual wellness exam.  Concerned about the BP elevation on home monitor.  C/O constipation.  C/O non productive cough, 4 months duration, developed after an acute URI.    Patient Care Team:  Sana Starr MD as PCP - General (Internal Medicine)  Sana Starr MD as PCP - [facility] Attributed Provider     Review of Systems  Weight gain   Cough  Constipation    Objective   Vitals:  /80   Pulse 69   Ht 1.575 m (5' 2\")   Wt 79.4 kg (175 lb)   SpO2 97%   BMI 32.01 kg/m²       Physical Exam  NAD. Cooperative.  HEENT: WNL  Neck: WNL  Lungs CTA  Heart: RRR  Abdomen: WNL  Musculoskeletal system: WNL  Neurologic exam: WNL    Assessment & Plan  Hypertriglyceridemia    Orders:    fenofibrate (Triglide) 160 mg tablet; Take 1 tablet (160 mg) by mouth once daily.    Hypothyroidism, unspecified type    Orders:    levothyroxine (Synthroid, Levoxyl) 50 mcg tablet; Take 1 tablet (50 mcg) by mouth once daily in the morning. Take before meals.    TSH; Future    Hypertension, unspecified type    Orders:    Albumin-Creatinine Ratio, Urine Random; Future    losartan (Cozaar) 50 mg tablet; Take 1 tablet (50 mg) by mouth once daily.  Discussed uncontrolled HTN, BP goals, cardiovascular complications due to uncontrolled state. Recommended to increase the Losartan to 50 mg daily, in addition to a healthy life style, DASH eating diet. Home BP monitor validation in the office, if available. Close office BP monitoring in 12 weeks.  Depression with anxiety    Orders:    venlafaxine XR (Effexor-XR) 37.5 mg 24 hr capsule; Take 1 " capsule (37.5 mg) by mouth once daily.    Hyperlipidemia, unspecified hyperlipidemia type    Orders:    rosuvastatin (Crestor) 10 mg tablet; Take 1 tablet (10 mg) by mouth once daily at bedtime.    Lipid panel; Future    Routine general medical examination at health care facility    Orders:    1 Year Follow Up In [facility] - PCP - Wellness Exam; Future    Medicare annual wellness visit, subsequent    Orders:    Basic Metabolic Panel; Future    Immunization due    Orders:    Flu vaccine, trivalent, preservative free, HIGH-DOSE, age 65y+ (Fluzone)    IFG (impaired fasting glucose)    Orders:    Hemoglobin A1C; Future    Constipation, unspecified constipation type  Recommended Anne Marie Lax OTC, hydration, dairy free diet.         Cough due to bronchospasm    Orders:    albuterol (ProAir HFA) 90 mcg/actuation inhaler; Inhale 2 puffs every 6 hours if needed for other (cough).    predniSONE (Deltasone) 10 mg tablet; Take 4 tablets daily for 3 days Take 3 tablets daily for 3 days Take 2 tablets daily for 3 days Take 1 tablet daily for 3 days    Obstructive hypertrophic cardiomyopathy (Multi)  S/P myotomy

## 2025-03-13 NOTE — ASSESSMENT & PLAN NOTE
Orders:    venlafaxine XR (Effexor-XR) 37.5 mg 24 hr capsule; Take 1 capsule (37.5 mg) by mouth once daily.

## 2025-03-18 DIAGNOSIS — I42.2 CARDIOMYOPATHY, HYPERTROPHIC (MULTI): ICD-10-CM

## 2025-03-18 LAB
ALBUMIN/CREAT UR: NORMAL MG/G CREAT
ANION GAP SERPL CALCULATED.4IONS-SCNC: 11 MMOL/L (CALC) (ref 7–17)
BUN SERPL-MCNC: 16 MG/DL (ref 7–25)
BUN/CREAT SERPL: NORMAL (CALC) (ref 6–22)
CALCIUM SERPL-MCNC: 9.6 MG/DL (ref 8.6–10.4)
CHLORIDE SERPL-SCNC: 100 MMOL/L (ref 98–110)
CHOLEST SERPL-MCNC: 159 MG/DL
CHOLEST/HDLC SERPL: 3.7 (CALC)
CO2 SERPL-SCNC: 25 MMOL/L (ref 20–32)
CREAT SERPL-MCNC: 0.72 MG/DL (ref 0.5–1.05)
CREAT UR-MCNC: 58 MG/DL (ref 20–275)
EGFRCR SERPLBLD CKD-EPI 2021: 92 ML/MIN/1.73M2
EST. AVERAGE GLUCOSE BLD GHB EST-MCNC: 120 MG/DL
EST. AVERAGE GLUCOSE BLD GHB EST-SCNC: 6.6 MMOL/L
GLUCOSE SERPL-MCNC: 90 MG/DL (ref 65–139)
HBA1C MFR BLD: 5.8 % OF TOTAL HGB
HDLC SERPL-MCNC: 43 MG/DL
LDLC SERPL CALC-MCNC: 83 MG/DL (CALC)
MICROALBUMIN UR-MCNC: <0.2 MG/DL
NONHDLC SERPL-MCNC: 116 MG/DL (CALC)
POTASSIUM SERPL-SCNC: 5.2 MMOL/L (ref 3.5–5.3)
SODIUM SERPL-SCNC: 136 MMOL/L (ref 135–146)
TRIGL SERPL-MCNC: 239 MG/DL
TSH SERPL-ACNC: 1.28 MIU/L (ref 0.4–4.5)

## 2025-03-18 RX ORDER — METOPROLOL SUCCINATE 50 MG/1
50 TABLET, EXTENDED RELEASE ORAL DAILY
Qty: 90 TABLET | Refills: 0 | Status: SHIPPED | OUTPATIENT
Start: 2025-03-18

## 2025-05-09 ENCOUNTER — HOSPITAL ENCOUNTER (OUTPATIENT)
Dept: RADIATION ONCOLOGY | Facility: CLINIC | Age: 70
Setting detail: RADIATION/ONCOLOGY SERIES
Discharge: HOME | End: 2025-05-09
Payer: MEDICARE

## 2025-05-09 VITALS
WEIGHT: 174.4 LBS | SYSTOLIC BLOOD PRESSURE: 139 MMHG | TEMPERATURE: 97.2 F | RESPIRATION RATE: 18 BRPM | BODY MASS INDEX: 31.9 KG/M2 | OXYGEN SATURATION: 95 % | DIASTOLIC BLOOD PRESSURE: 81 MMHG | HEART RATE: 78 BPM

## 2025-05-09 DIAGNOSIS — Z90.11 STATUS POST PARTIAL MASTECTOMY OF RIGHT BREAST: Primary | ICD-10-CM

## 2025-05-09 DIAGNOSIS — D05.11 DUCTAL CARCINOMA IN SITU (DCIS) OF RIGHT BREAST: ICD-10-CM

## 2025-05-09 PROCEDURE — 99213 OFFICE O/P EST LOW 20 MIN: CPT | Performed by: NURSE PRACTITIONER

## 2025-05-09 RX ORDER — ANASTROZOLE 1 MG/1
1 TABLET ORAL NIGHTLY
Qty: 90 TABLET | Refills: 3 | Status: SHIPPED | OUTPATIENT
Start: 2025-05-09

## 2025-05-09 ASSESSMENT — PAIN SCALES - GENERAL: PAINLEVEL_OUTOF10: 0-NO PAIN

## 2025-05-09 NOTE — PROGRESS NOTES
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      Treatment Summary:     - Routine screening mammogram on 2020, which revealed a small group of calcifications in the lower inner quadrant of the right breast which had increased in number.  She underwent a diagnostic right mammogram on 2020 which revealed the calcifications  to probably be benign and a 6-month follow-up was recommended.  This was performed 3/8/2021.  The calcifications were felt to be stable.  She underwent bilateral mammograms on 2021.  The calcifications had slightly increased in number.       - On 1/3/2022 she underwent a right breast core biopsy.  Pathology revealed a 0.16 cm ductal carcinoma in situ, grade 2.  Estrogen receptors stained positive at greater than 95%.  Progesterone receptors stained positive at 60%.  She developed a hypertrophic  obstructive cardiomyopathy and underwent a heart septal myectomy on 2022.  This revealed myocyte hypertrophy.       - On 4/15/2022 she underwent a right partial mastectomy.  Pathology revealed a 0.16 cm minute focus of ductal carcinoma in situ of the solid subtype, nuclear grade 1.  The resection margins were negative at greater than 2 mm.     - 7/15/22 - 22: Partial right breast radiation consisting of a total dose of 30 Gy given in 5 fractions of 6 Gy per  fraction.     SUBJECTIVE  History of Present Illness:   Laure Barton is here for routine radiation follow up. She is doing well. Breast well healed post treatment and skin is intact. No palpable findings on self exam. No swelling of right arm or difficulty with ROM. She continues on anastrozole and no adverse effects. She does not take calcium supplements but takes Vit D when she remembers. Energy level is baseline. Denies HA, fever, chills, cough, SOB, chest pain, N/V or bony pain. Mammogram in December 2024 without evidence of malignancy. DEXA scan normal bone density.     Review of Systems:    Review of Systems   All other systems reviewed and are negative.    Performance Status:   The Karnofsky performance scale today is 100, Fully active, able to carry on all pre-disease performed without restriction (ECOG equivalent 0).      OBJECTIVE    Current Outpatient Medications:     albuterol (ProAir HFA) 90 mcg/actuation inhaler, Inhale 2 puffs every 6 hours if needed for other (cough)., Disp: 18 g, Rfl: 0    anastrozole (Arimidex) 1 mg tablet, Take 1 tablet (1 mg total) by mouth once daily at bedtime., Disp: 90 tablet, Rfl: 3    fenofibrate (Triglide) 160 mg tablet, Take 1 tablet (160 mg) by mouth once daily., Disp: 90 tablet, Rfl: 3    levothyroxine (Synthroid, Levoxyl) 50 mcg tablet, Take 1 tablet (50 mcg) by mouth once daily in the morning. Take before meals., Disp: 90 tablet, Rfl: 3    losartan (Cozaar) 50 mg tablet, Take 1 tablet (50 mg) by mouth once daily., Disp: 90 tablet, Rfl: 1    metoprolol succinate XL (Toprol-XL) 50 mg 24 hr tablet, TAKE 1 TABLET (50 MG) BY MOUTH ONCE DAILY., Disp: 90 tablet, Rfl: 0    predniSONE (Deltasone) 10 mg tablet, Take 4 tablets daily for 3 days Take 3 tablets daily for 3 days Take 2 tablets daily for 3 days Take 1 tablet daily for 3 days, Disp: 30 tablet, Rfl: 0    rosuvastatin (Crestor) 10 mg tablet, Take 1 tablet (10 mg) by mouth once daily at bedtime., Disp: 90 tablet, Rfl:  3    venlafaxine XR (Effexor-XR) 37.5 mg 24 hr capsule, Take 1 capsule (37.5 mg) by mouth once daily., Disp: 90 capsule, Rfl: 3     Physical Exam  Vitals reviewed.   Constitutional:       Appearance: Normal appearance.   HENT:      Head: Normocephalic and atraumatic.      Nose: Nose normal.      Mouth/Throat:      Mouth: Mucous membranes are moist.      Pharynx: Oropharynx is clear.   Eyes:      Conjunctiva/sclera: Conjunctivae normal.      Pupils: Pupils are equal, round, and reactive to light.   Cardiovascular:      Rate and Rhythm: Normal rate and regular rhythm.   Pulmonary:      Effort: Pulmonary effort is normal.   Chest:   Breasts:     Right: No swelling, inverted nipple, mass, nipple discharge or skin change.      Left: No swelling, inverted nipple, mass, nipple discharge or skin change.   Abdominal:      Palpations: Abdomen is soft.   Musculoskeletal:         General: Normal range of motion.      Cervical back: Normal range of motion and neck supple.   Lymphadenopathy:      Cervical: No cervical adenopathy.      Upper Body:      Right upper body: No supraclavicular or axillary adenopathy.      Left upper body: No supraclavicular or axillary adenopathy.   Skin:     General: Skin is warm and dry.   Neurological:      General: No focal deficit present.      Mental Status: She is alert and oriented to person, place, and time.   Psychiatric:         Mood and Affect: Mood normal.         Behavior: Behavior normal.         RESULTS:  Narrative & Impression   Interpreted By:  Jase Gan,   STUDY:  DEXA BONE MPEGMSV02/28/2024 11:06 am      INDICATION:  Signs/Symptoms:Hx breast cancer and taking aromatast inhibitor.  The  patient is a 68 y/o  year old F.      ,D05.11 Intraductal carcinoma in situ of right breast,Z79.811 Long  term (current) use of aromatase inhibitors      COMPARISON:  10/04/2022      ACCESSION NUMBER(S):  [id number]      ORDERING CLINICIAN:  BARBRA HUSTON      TECHNIQUE:  DEXA BONE DENSITY       FINDINGS:  SPINE L1-L4  Bone Mineral Density: 0.939  T-Score -1  Z-Score 0.9  Bone Mineral Density change vs baseline:  -3.9  Bone Mineral Density change vs previous: -8.0      LEFT FEMUR -TOTAL  Bone Mineral Density: 1.012  T-Score 0.6   Z-Score  1.9  Bone Mineral Density change vs baseline: 7.8  Bone Mineral Density change vs previous: 8.2      LEFT FEMUR -NECK  Bone Mineral Density: 0.84  T-Score -0.1  Z-Score 1.5  Bone Mineral Density change vs baseline: Not reported  Bone Mineral Density change vs previous: Not reported      LEFT FOREARM  Total  Bone Mineral Density: Not reported  T-Score Not reported  Z-Score Not reported  UD  Bone Mineral Density: Not reported  T-Score Not reported Z-Score Not reported  MID  Bone Mineral Density: Not reported  T-Score Not reported Z-Score Not reported  1/3  Bone Mineral Density: Not reported  T-Score Not reported   Z-Score Not reported  Bone Mineral Density change vs baseline:  Not reported  Bone Mineral Density change vs previous:  Not reported      RIGHT FEMUR -TOTAL  Bone Mineral Density: Not reported  T-Score Not reported   Z-Score Not reported  Bone Mineral Density change vs baseline:  Not reported  Bone Mineral Density change vs previous: Not reported      RIGHT FEMUR -NECK  Bone Mineral Density: Not reported  T-Score Not reported   Z-Score  Not reported  Bone Mineral Density change vs baseline:  Bone Mineral Density change vs previous:      RIGHT FOREARM  Bone Mineral Density: Not reported  T-Score Not reported   Z-Score Not reported  UD  Bone Mineral Density: Not reported  T-Score Not reported  Z-Score Not reported  MID  Bone Mineral Density: Not reported  T-Score Not reported  Z-Score  Not reported  1/3  Bone Mineral Density: Not reported  T-Score Not reported  Z-Score Not reported  Bone Mineral Density change vs baseline:  Not reported  Bone Mineral Density change vs previous:  Not reported      World Health Organization (WHO) criteria for  post-menopausal,   Women:  Normal:         T-score at or above -1 SD  Osteopenia:   T-score between -1 and -2.5 SD  Osteoporosis: T-score at or below -2.5 SD      10-year Fracture Risk:  Major Osteoporotic Fracture  Not reported  Hip Fracture                        Not reported      Note:  If no FRAX score is reported, it is because:  Some T-score for Spine Total or Hip Total or Femoral Neck at or below  -2.5      This exam was performed at [facility] on a [facility] Dexa Unit.      IMPRESSION:  DEXA:  According to World Health Organization criteria,  classification is normal.         Narrative & Impression   Interpreted By:  Bry Arroyo and Ebai Jerky   STUDY:  BI MAMMO BILATERAL SCREENING TOMOSYNTHESIS;  1/3/2025 9:30 am      ACCESSION NUMBER(S):  [id number]      ORDERING CLINICIAN:  JOEL CERVANTES      INDICATION:  Screening. History of right lumpectomy with radiation. Family history  of breast cancer.      ,Z12.31 Encounter for screening mammogram for malignant neoplasm of  breast      COMPARISON:  12/14/2023, 12/08/2022, 04/15/2022 and 01/03/2022.      FINDINGS:  2D and tomosynthesis images were reviewed at 1 mm slice thickness.      Density:  There are scattered areas of fibroglandular density.      Postsurgical changes are again noted within the central medial aspect  of the right breast at posterior depth. No suspicious masses or  calcifications are identified.      IMPRESSION:  No mammographic evidence of malignancy.      BI-RADS CATEGORY:  BI-RADS Category:  2 Benign.  Recommendation:  Annual Screening.  Recommended Date:  1 Year.  Laterality:  Bilateral.     ASSESSMENT/PLAN:  69 y.o. female with DCIS of right breast s/p breast conserving surgery followed by partial breast radiation. She is doing well and cosmesis is excellent. Mammogram and FUV with Dr. Cervantes in January 2026. Continue anastrozole. RX refill for anastrozole sent to her pharmacy. Radiation follow up in 12  mo. Call with any concerns.     Urvashi Haines CNP  [phone]

## 2025-05-15 ENCOUNTER — APPOINTMENT (OUTPATIENT)
Dept: DERMATOLOGY | Facility: CLINIC | Age: 70
End: 2025-05-15
Payer: MEDICARE

## 2025-05-20 ENCOUNTER — APPOINTMENT (OUTPATIENT)
Dept: DERMATOLOGY | Facility: CLINIC | Age: 70
End: 2025-05-20
Payer: MEDICARE

## 2025-05-20 DIAGNOSIS — L91.8 SKIN TAG: ICD-10-CM

## 2025-05-20 DIAGNOSIS — L82.1 SEBORRHEIC KERATOSIS: ICD-10-CM

## 2025-05-20 DIAGNOSIS — L82.0 INFLAMED SEBORRHEIC KERATOSIS: ICD-10-CM

## 2025-05-20 DIAGNOSIS — L57.8 ACTINIC SKIN DAMAGE: ICD-10-CM

## 2025-05-20 DIAGNOSIS — L57.0 ACTINIC KERATOSIS: Primary | ICD-10-CM

## 2025-05-20 DIAGNOSIS — D22.9 MULTIPLE BENIGN NEVI: ICD-10-CM

## 2025-05-20 DIAGNOSIS — L81.4 LENTIGO: ICD-10-CM

## 2025-05-20 DIAGNOSIS — Z12.83 SCREENING EXAM FOR SKIN CANCER: ICD-10-CM

## 2025-05-20 ASSESSMENT — DERMATOLOGY QUALITY OF LIFE (QOL) ASSESSMENT
RATE HOW EMOTIONALLY BOTHERED YOU ARE BY YOUR SKIN PROBLEM (FOR EXAMPLE, WORRY, EMBARRASSMENT, FRUSTRATION): 0 - NEVER BOTHERED
DATE THE QUALITY-OF-LIFE ASSESSMENT WAS COMPLETED: 67345
RATE HOW BOTHERED YOU ARE BY SYMPTOMS OF YOUR SKIN PROBLEM (EG, ITCHING, STINGING BURNING, HURTING OR SKIN IRRITATION): 0 - NEVER BOTHERED
RATE HOW BOTHERED YOU ARE BY EFFECTS OF YOUR SKIN PROBLEMS ON YOUR ACTIVITIES (EG, GOING OUT, ACCOMPLISHING WHAT YOU WANT, WORK ACTIVITIES OR YOUR RELATIONSHIPS WITH OTHERS): 0 - NEVER BOTHERED
ARE THERE EXCLUSIONS OR EXCEPTIONS FOR THE QUALITY OF LIFE ASSESSMENT: NO

## 2025-05-20 ASSESSMENT — DERMATOLOGY PATIENT ASSESSMENT
ARE YOU AN ORGAN TRANSPLANT RECIPIENT: NO
DO YOU HAVE ANY NEW OR CHANGING LESIONS: NO
HAVE YOU HAD OR DO YOU HAVE A STAPH INFECTION: NO
DO YOU USE SUNSCREEN: OCCASIONALLY
HAVE YOU HAD OR DO YOU HAVE VASCULAR DISEASE: NO
ARE YOU ON BIRTH CONTROL: NO
DO YOU HAVE IRREGULAR MENSTRUAL CYCLES: NO
ARE YOU TRYING TO GET PREGNANT: NO
DO YOU USE A TANNING BED: NO

## 2025-05-20 ASSESSMENT — ITCH NUMERIC RATING SCALE: HOW SEVERE IS YOUR ITCHING?: 0

## 2025-05-20 ASSESSMENT — PATIENT GLOBAL ASSESSMENT (PGA): PATIENT GLOBAL ASSESSMENT: PATIENT GLOBAL ASSESSMENT:  1 - CLEAR

## 2025-05-20 NOTE — Clinical Note
Stuck on, waxy macule(s)/papule(s)/plaque(s) with comedo-like openings and milia like cysts    Underneath breasts and around eyes. Interested in cosmetic removal. May not be able to remove all around the eyes. She will consider and send [employer] message if she is interested and whether she wants resident discount or not

## 2025-05-20 NOTE — PROGRESS NOTES
Subjective     Laure Barton is a 69 y.o. female who presents for the following: Skin Check.     Last derm visit 2/6/25 with Dr. Renee  for seborrheic keratosis. Last Full Skin Exam 5/8/24. History of actinic keratoses.     Intake Questions  Do you have any new or changing Lesions?: No  Are you an organ transplant recipient?: No  Have you had or do you have a Staph Infection?: No  Have you had or do you have Vacular Disease?: No  Do you use sunscreen?: Occasionally  Do you use a tanning bed?: No  Are you trying to get pregnant?: No  Are you on birth control?: No  Do you have irregular menstrual cycles?: No    Review of Systems:  No other skin or systemic complaints other than what is documented elsewhere in the note.    The following portions of the chart were reviewed this encounter and updated as appropriate:  Tobacco  Allergies  Meds  Problems  Med Hx  Surg Hx         Skin Cancer History  Biopsy Log Book  No skin cancers from Specimen Tracking.    Additional History      Specialty Problems          Dermatology Problems    Hemangioma of skin and subcutaneous tissue    Hypertrophic scar    Keloid    Melanocytic nevi of trunk    Other hypertrophic disorders of the skin    Other melanin hyperpigmentation    Other seborrheic keratosis    Contusion of rib        Objective   Well appearing patient in no apparent distress; mood and affect are within normal limits.    A full examination was performed including scalp, head, eyes, ears, nose, lips, neck, chest, axillae, abdomen, back, buttocks, bilateral upper extremities, bilateral lower extremities, hands, feet, fingers, toes, fingernails, and toenails. All findings within normal limits unless otherwise noted below.    Assessment/Plan   Skin Exam  1. ACTINIC KERATOSIS  Mid Forehead  Erythematous scaly macule(s)  -Discussed nature of diagnosis and treatment options.   -Patient wishes to proceed with Cryotherapy today  -Possible side effects of liquid nitrogen  treatment reviewed including formation of blisters, crusting, tenderness, scar, and discoloration which may be permanent.  -Patient advised to return the office for re-evaluation if the treated lesion(s) do not resolve within 4-6 weeks. Patient verbalizes understanding.  Destr of lesion - Mid Forehead  Complexity: simple    Destruction method: cryotherapy    Informed consent: discussed and consent obtained    Lesion destroyed using liquid nitrogen: Yes    Outcome: patient tolerated procedure well with no complications    Post-procedure details: wound care instructions given    2. SCREENING EXAM FOR SKIN CANCER  Generalized  As part of a routine Full Skin Exam, a genital examination with the presence of a chaperone was offered. The patient agreed to the exam and declined the chaperone.     Full body skin exam  -No lesions concerning for malignancy on the remainder the skin exam today   - The ugly duckling sign was discussed. Monitor for any skin lesions that are different in color, shape, or size than others on body  -Sun protection was discussed. Recommend SPF 30+, hats with brims, sun protective clothing, and avoiding sun exposure between 10 AM and 2 PM whenever possible  -Recommend regular skin exams or sooner if new or changing lesions     Related Procedures  Follow Up In Dermatology - Established Patient  Follow Up In Dermatology - Established Patient  3. MULTIPLE BENIGN NEVI  Generalized  Brown and tan macules and papules with reassuring findings on dermoscopy  -These lesions have benign, reassuring patterns on dermoscopy  -Recommend continued self observation, and to contact the office if any changes in nevi are noticed  4. LENTIGO  Generalized  Tan macules  -Benign appearing on exam  -Reassurance, recommend observation  5. SEBORRHEIC KERATOSIS  Generalized  Stuck on, waxy macule(s)/papule(s)/plaque(s) with comedo-like openings and milia like cysts    Underneath breasts and around eyes. Interested in cosmetic  removal. May not be able to remove all around the eyes. She will consider and send [employer] message if she is interested and whether she wants resident discount or not  -Discussed the nature of the diagnosis  -Reassurance, recommend continued observation  6. SKIN TAG (2)  Left Lower Eyelid, Neck - Anterior  Flesh colored pedunculated papule(s); there is no clinically evident irritation or inflammation  -Discussed nature of the condition  -Reassurance  -Removal may be performed for an out of pocket fee given cosmetic nature of removal    - she is interested in removal. Skin tags near left eye and also on neck and under arms. Combination of scissor, liquid nitrogen, and hyfrecation may be used  - schedule for future cosmetic visit. She is debating regarding resident discount. Schedule for Weds AM to ensure female resident  Related Procedures  Follow Up In Dermatology - Established Patient  7. ACTINIC SKIN DAMAGE  Generalized  Background of photodamage with hyper- and hypo-pigmented macules on the skin    Actinic keratosis treated on lip by Indira Santana is resolved  8. INFLAMED SEBORRHEIC KERATOSIS  Neck - Posterior  Stuck-on, waxy macule(s)/papule(s)/plaque(s) with comedo-like openings and milia-like cysts with surrounding erythema and crusting  -Patient requests cryotherapy today for these clinically inflamed lesions  -Possible side effects of liquid nitrogen treatment reviewed including formation of blisters, crusting, tenderness, scar, and discoloration which may be permanent.  Destr of lesion - Neck - Posterior  Complexity: simple    Destruction method: cryotherapy    Informed consent: discussed and consent obtained    Lesion destroyed using liquid nitrogen: Yes    Outcome: patient tolerated procedure well with no complications        Follow up 1 year Full Skin Exam   Follow up sooner for cosmetic skin tag, seborrheic keratosis removal

## 2025-05-20 NOTE — Clinical Note
Background of photodamage with hyper- and hypo-pigmented macules on the skin    Actinic keratosis treated on lip by Indira Santana is resolved

## 2025-05-20 NOTE — Clinical Note
-Discussed nature of the condition  -Reassurance  -Removal may be performed for an out of pocket fee given cosmetic nature of removal    - she is interested in removal. Skin tags near left eye and also on neck and under arms. Combination of scissor, liquid nitrogen, and hyfrecation may be used  - schedule for future cosmetic visit. She is debating regarding resident discount. Schedule for Weds AM to ensure female resident

## 2025-05-20 NOTE — Clinical Note
As part of a routine Full Skin Exam, a genital examination with the presence of a chaperone was offered. The patient agreed to the exam and declined the chaperone.

## 2025-06-13 ENCOUNTER — APPOINTMENT (OUTPATIENT)
Dept: PRIMARY CARE | Facility: CLINIC | Age: 70
End: 2025-06-13
Payer: MEDICARE

## 2025-06-13 VITALS
HEART RATE: 84 BPM | BODY MASS INDEX: 32.74 KG/M2 | DIASTOLIC BLOOD PRESSURE: 80 MMHG | OXYGEN SATURATION: 99 % | WEIGHT: 179 LBS | SYSTOLIC BLOOD PRESSURE: 129 MMHG

## 2025-06-13 DIAGNOSIS — E66.811 OBESITY (BMI 30.0-34.9): ICD-10-CM

## 2025-06-13 DIAGNOSIS — Z28.39 INCOMPLETE IMMUNIZATION STATUS: ICD-10-CM

## 2025-06-13 DIAGNOSIS — I10 PRIMARY HYPERTENSION: Primary | ICD-10-CM

## 2025-06-13 PROCEDURE — G8433 SCR FOR DEP NOT CPT DOC RSN: HCPCS | Performed by: INTERNAL MEDICINE

## 2025-06-13 PROCEDURE — 99214 OFFICE O/P EST MOD 30 MIN: CPT | Performed by: INTERNAL MEDICINE

## 2025-06-13 PROCEDURE — 3079F DIAST BP 80-89 MM HG: CPT | Performed by: INTERNAL MEDICINE

## 2025-06-13 PROCEDURE — 3074F SYST BP LT 130 MM HG: CPT | Performed by: INTERNAL MEDICINE

## 2025-06-13 PROCEDURE — 1160F RVW MEDS BY RX/DR IN RCRD: CPT | Performed by: INTERNAL MEDICINE

## 2025-06-13 PROCEDURE — 1036F TOBACCO NON-USER: CPT | Performed by: INTERNAL MEDICINE

## 2025-06-13 PROCEDURE — 1159F MED LIST DOCD IN RCRD: CPT | Performed by: INTERNAL MEDICINE

## 2025-06-13 NOTE — PROGRESS NOTES
Subjective   Patient ID: Laure Barton is a 69 y.o. female who presents for Follow-up (Patient here for follow-up visit).    HPI   The patient presents to the office for blood pressure monitoring, the current medication regimen is tolerated well.    Review of Systems  Weight gain  Objective   /80   Pulse 84   Wt 81.2 kg (179 lb)   SpO2 99%   BMI 32.74 kg/m²     Physical Exam  NAD. Cooperative.  Lungs CTA  Heart: RRR  LE: negative     Assessment/Plan   Diagnoses and all orders for this visit:  Primary hypertension  Obesity (BMI 30.0-34.9)  -     tirzepatide, weight loss, (Zepbound) 2.5 mg/0.5 mL injection; Inject 2.5 mg under the skin every 7 days.  Incomplete immunization status  -     Measles ([place]ube[place]) Antibody, IgG; Future    HTN is at optimum control, recommended the same medication regimen.

## 2025-08-13 ENCOUNTER — APPOINTMENT (OUTPATIENT)
Dept: DERMATOLOGY | Facility: CLINIC | Age: 70
End: 2025-08-13
Payer: MEDICARE

## 2025-08-13 DIAGNOSIS — Z41.9 ELECTIVE SURGICAL PROCEDURE: Primary | ICD-10-CM

## 2025-08-20 ENCOUNTER — OFFICE VISIT (OUTPATIENT)
Dept: URGENT CARE | Age: 70
End: 2025-08-20
Payer: MEDICARE

## 2025-08-20 VITALS
TEMPERATURE: 98.2 F | OXYGEN SATURATION: 96 % | SYSTOLIC BLOOD PRESSURE: 133 MMHG | HEART RATE: 62 BPM | RESPIRATION RATE: 16 BRPM | WEIGHT: 175 LBS | DIASTOLIC BLOOD PRESSURE: 80 MMHG | BODY MASS INDEX: 32.01 KG/M2

## 2025-08-20 DIAGNOSIS — L23.7 POISON IVY DERMATITIS: Primary | ICD-10-CM

## 2025-08-20 PROCEDURE — 3075F SYST BP GE 130 - 139MM HG: CPT

## 2025-08-20 PROCEDURE — 1036F TOBACCO NON-USER: CPT

## 2025-08-20 PROCEDURE — 3079F DIAST BP 80-89 MM HG: CPT

## 2025-08-20 PROCEDURE — 99213 OFFICE O/P EST LOW 20 MIN: CPT

## 2025-08-20 PROCEDURE — 1159F MED LIST DOCD IN RCRD: CPT

## 2025-08-20 RX ORDER — CLOBETASOL PROPIONATE 0.5 MG/G
OINTMENT TOPICAL 2 TIMES DAILY
Qty: 30 G | Refills: 0 | Status: SHIPPED | OUTPATIENT
Start: 2025-08-20 | End: 2025-08-27

## 2025-08-21 DIAGNOSIS — I42.2 CARDIOMYOPATHY, HYPERTROPHIC (MULTI): ICD-10-CM

## 2025-08-21 RX ORDER — METOPROLOL SUCCINATE 50 MG/1
50 TABLET, EXTENDED RELEASE ORAL DAILY
Qty: 90 TABLET | Refills: 0 | Status: SHIPPED | OUTPATIENT
Start: 2025-08-21

## 2026-05-19 ENCOUNTER — APPOINTMENT (OUTPATIENT)
Dept: DERMATOLOGY | Facility: CLINIC | Age: 71
End: 2026-05-19
Payer: MEDICARE